# Patient Record
Sex: FEMALE | Race: WHITE | NOT HISPANIC OR LATINO | Employment: FULL TIME | URBAN - METROPOLITAN AREA
[De-identification: names, ages, dates, MRNs, and addresses within clinical notes are randomized per-mention and may not be internally consistent; named-entity substitution may affect disease eponyms.]

---

## 2017-09-18 ENCOUNTER — HOSPITAL ENCOUNTER (OUTPATIENT)
Dept: RADIOLOGY | Facility: MEDICAL CENTER | Age: 62
End: 2017-09-18
Attending: FAMILY MEDICINE
Payer: COMMERCIAL

## 2017-09-18 DIAGNOSIS — Z12.31 VISIT FOR SCREENING MAMMOGRAM: ICD-10-CM

## 2017-09-18 PROCEDURE — G0202 SCR MAMMO BI INCL CAD: HCPCS

## 2018-07-16 ENCOUNTER — OFFICE VISIT (OUTPATIENT)
Dept: MEDICAL GROUP | Facility: PHYSICIAN GROUP | Age: 63
End: 2018-07-16
Payer: COMMERCIAL

## 2018-07-16 VITALS
HEART RATE: 72 BPM | OXYGEN SATURATION: 95 % | RESPIRATION RATE: 14 BRPM | DIASTOLIC BLOOD PRESSURE: 86 MMHG | SYSTOLIC BLOOD PRESSURE: 126 MMHG | HEIGHT: 64 IN | BODY MASS INDEX: 22.02 KG/M2 | WEIGHT: 129 LBS | TEMPERATURE: 98.6 F

## 2018-07-16 DIAGNOSIS — Z79.899 CHRONIC USE OF BENZODIAZEPINE FOR THERAPEUTIC PURPOSE: ICD-10-CM

## 2018-07-16 DIAGNOSIS — F33.1 MODERATE EPISODE OF RECURRENT MAJOR DEPRESSIVE DISORDER (HCC): ICD-10-CM

## 2018-07-16 DIAGNOSIS — Z12.39 SCREENING FOR BREAST CANCER: ICD-10-CM

## 2018-07-16 DIAGNOSIS — F41.9 ANXIETY: ICD-10-CM

## 2018-07-16 PROCEDURE — 99204 OFFICE O/P NEW MOD 45 MIN: CPT | Performed by: NURSE PRACTITIONER

## 2018-07-16 RX ORDER — ESTRADIOL 0.1 MG/G
1 CREAM VAGINAL
Qty: 1 TUBE | Refills: 6 | Status: SHIPPED | OUTPATIENT
Start: 2018-07-16 | End: 2018-07-20

## 2018-07-16 RX ORDER — BUPROPION HYDROCHLORIDE 150 MG/1
150 TABLET ORAL EVERY MORNING
Qty: 30 TAB | Refills: 1 | Status: SHIPPED | OUTPATIENT
Start: 2018-07-16 | End: 2018-08-20 | Stop reason: SDUPTHER

## 2018-07-16 RX ORDER — ALPRAZOLAM 2 MG/1
2 TABLET ORAL NIGHTLY PRN
COMMUNITY
End: 2018-07-26 | Stop reason: SDUPTHER

## 2018-07-16 RX ORDER — TEMAZEPAM 30 MG/1
30 CAPSULE ORAL NIGHTLY PRN
COMMUNITY
End: 2018-07-26 | Stop reason: SDUPTHER

## 2018-07-16 ASSESSMENT — PATIENT HEALTH QUESTIONNAIRE - PHQ9
5. POOR APPETITE OR OVEREATING: 1 - SEVERAL DAYS
SUM OF ALL RESPONSES TO PHQ QUESTIONS 1-9: 14
CLINICAL INTERPRETATION OF PHQ2 SCORE: 4

## 2018-07-17 NOTE — ASSESSMENT & PLAN NOTE
Currently taking sertraline 50 mg.  Has not really helped with this.  Feeling withdrawn, not feeling interested in normal activities.  Feels that it is stress related.  Feeling low libido.  Would like to try something else.

## 2018-07-17 NOTE — PROGRESS NOTES
Lupe Thompson is a 62 y.o. female here today to establish care and for evaluation and management of:    HPI:    Moderate episode of recurrent major depressive disorder (HCC)  Currently taking sertraline 50 mg.  Has not really helped with this.  Feeling withdrawn, not feeling interested in normal activities.  Feels that it is stress related.  Feeling low libido.  Would like to try something else.          Anxiety  Taking two benzodiazepams at night.  Will work on decreasing xanax.       Current medicines (including changes today)  Current Outpatient Prescriptions   Medication Sig Dispense Refill   • sertraline (ZOLOFT) 50 MG Tab Take 50 mg by mouth every day.     • temazepam (RESTORIL) 30 MG capsule Take 30 mg by mouth at bedtime as needed for Sleep.     • alprazolam (XANAX) 2 MG tablet Take 2 mg by mouth at bedtime as needed for Sleep.     • estradiol (ESTRACE) 0.1 MG/GM vaginal cream Insert 1 g in vagina every 3 days. 1 Tube 6   • buPROPion (WELLBUTRIN XL) 150 MG XL tablet Take 1 Tab by mouth every morning. 30 Tab 1   • Ferrous Sulfate (IRON CR PO) Take 1 Tab by mouth every day.     • Multiple Vitamin (MULTIVITAMIN PO) Take 1 Tab by mouth every day.     • CALCIUM PO Take 1 Tab by mouth 3 times a day.     • VITAMIN D PO Take 2 Tabs by mouth 2 Times a Day.       No current facility-administered medications for this visit.        She  has a past medical history of Anxiety; Arthritis; Breast cancer ([facility]); Cancer ([facility]) (1993); Dental disorder; Depression; and Other specified symptom associated with female genital organs. She also has no past medical history of Hypertension.    She  has a past surgical history that includes hysterectomy laparoscopy (07/1980); other; gastric banding laparoscopic (8/25/2010); pr radiation therapy plan simple; abdominal exploration; and lumpectomy.    Social History   Substance Use Topics   • Smoking status: Never Smoker   • Smokeless tobacco: Never Used   • Alcohol use Yes       "Comment: once a month       Social History     Social History Narrative   • No narrative on file       Family History   Problem Relation Age of Onset   • Cancer Maternal Aunt    • Hypertension Mother    • Cancer Father      lung       Family Status   Relation Status   • Maternal Aunt Alive   • Mother Alive   • Father          ROS  As stated in hpi  All other systems reviewed and are negative     Objective:     Blood pressure 126/86, pulse 72, temperature 37 °C (98.6 °F), resp. rate 14, height 1.619 m (5' 3.75\"), weight 58.5 kg (129 lb), SpO2 95 %. Body mass index is 22.32 kg/m².  Physical Exam:    Constitutional: Alert, no distress.  Skin: Warm, dry, good turgor, no rashes in visible areas.  Eye: Equal, round and reactive, conjunctiva clear, lids normal.  ENMT: Lips without lesions, good dentition, oropharynx clear.  Neck: Trachea midline, no masses, no thyromegaly. No cervical or supraclavicular lymphadenopathy.  Respiratory: Unlabored respiratory effort, lungs clear to auscultation, no wheezes, no ronchi.  Cardiovascular: Normal S1, S2, no murmur, no edema.  Abdomen: Soft, non-tender, no masses, no hepatosplenomegaly.  Psych: Alert and oriented x3, normal affect and mood.        Assessment and Plan:   The following treatment plan was discussed    1. Moderate episode of recurrent major depressive disorder (HCC)  This is a new problem to me.  Chronic, ongoing issue, unstable.  Will wean off sertraline and try wellbutrin.  Return in 4 weeks.  Patient does not want to seek counseling at this time.  Monitor and follow.                                                                 2. Chronic use of benzodiazepine for therapeutic purpose  This is a new problem to me.  Chronic, ongoing, unstable.  Patient is on two benzodiazepams.  Discussed withdrawal symptoms.  She wants to come off the Xanax and keep the Temazepam at night for sleep.  UDS/Agreement ordered.  Monitor and follow.  Return in 4 weeks.  Plan " discussed to wean down on the Xanax.    - [facility] PAIN MANAGEMENT SCREEN; Future  - Controlled Substance Treatment Agreement    3. Anxiety  See #2    4. Screening for breast cancer  Due for mammogram.  History of left lumpectomy in the past, benign.  Order provided.  Monitor and follow.   - MA-SCREEN MAMMO W/CAD-BILAT; Future      Records requested.  Followup: No Follow-up on file.

## 2018-07-18 ENCOUNTER — TELEPHONE (OUTPATIENT)
Dept: MEDICAL GROUP | Facility: PHYSICIAN GROUP | Age: 63
End: 2018-07-18

## 2018-07-18 DIAGNOSIS — N95.1 MENOPAUSAL SYNDROME ON HORMONE REPLACEMENT THERAPY: ICD-10-CM

## 2018-07-18 DIAGNOSIS — Z79.890 MENOPAUSAL SYNDROME ON HORMONE REPLACEMENT THERAPY: ICD-10-CM

## 2018-07-18 NOTE — TELEPHONE ENCOUNTER
VOICEMAIL  1. Caller Name: Lupe                      Call Back Number: [phone] (home) [phone] (work)      2. Message: Patient states the estradiol cream is almost $280 and she cannot afford it, she would like to go back on what she was on previously. Please advise     3. Patient approves office to leave a detailed voicemail/MyChart message: N\A

## 2018-07-19 NOTE — TELEPHONE ENCOUNTER
Can we call patient and find out what medication she was on previously.  I do not see a vaginal estrogen or other medication in her history  GIFTY Chou.

## 2018-07-20 RX ORDER — ESTERIFIED ESTROGEN AND METHYLTESTOSTERONE .625; 1.25 MG/1; MG/1
1 TABLET ORAL DAILY
Qty: 90 TAB | Refills: 0 | Status: SHIPPED | OUTPATIENT
Start: 2018-07-20 | End: 2018-10-23 | Stop reason: SDUPTHER

## 2018-07-20 NOTE — TELEPHONE ENCOUNTER
Left message informing patient.    RX faxed to     [facility] - [place] - [address]  [address] [place] [address]  Phone: [phone] Fax: [phone]

## 2018-07-20 NOTE — TELEPHONE ENCOUNTER
I have reordered the Covaryx.  We will fax over to your pharmacy.  I placed a 90 day supply.  GIFTY Chou.

## 2018-07-26 DIAGNOSIS — F51.04 PSYCHOPHYSIOLOGICAL INSOMNIA: ICD-10-CM

## 2018-07-26 DIAGNOSIS — F41.9 ANXIETY: ICD-10-CM

## 2018-07-26 RX ORDER — TEMAZEPAM 30 MG/1
30 CAPSULE ORAL NIGHTLY PRN
Qty: 30 CAP | Refills: 0 | Status: SHIPPED
Start: 2018-07-26 | End: 2018-08-20 | Stop reason: SDUPTHER

## 2018-07-26 RX ORDER — ALPRAZOLAM 2 MG/1
2 TABLET ORAL NIGHTLY PRN
Qty: 30 TAB | Refills: 0 | Status: SHIPPED
Start: 2018-07-26 | End: 2018-08-20 | Stop reason: SDUPTHER

## 2018-07-26 NOTE — TELEPHONE ENCOUNTER
Requested Prescriptions     Signed Prescriptions Disp Refills   • alprazolam (XANAX) 2 MG tablet 30 Tab 0     Sig: Take 1 Tab by mouth at bedtime as needed for Sleep for up to 30 days.     Authorizing Provider: MINERVA LAUREANO   • temazepam (RESTORIL) 30 MG capsule 30 Cap 0     Sig: Take 1 Cap by mouth at bedtime as needed for Sleep for up to 30 days.     Authorizing Provider: MINERVA LAUREANO A.P.R.N.

## 2018-07-26 NOTE — TELEPHONE ENCOUNTER
RXS FAXED TO     [facility] - [place] - [address]  [address] [address]  Phone: [phone] Fax: [phone]

## 2018-07-26 NOTE — TELEPHONE ENCOUNTER
Was the patient seen in the last year in this department? Yes    Does patient have an active prescription for medications requested? No     Received Request Via: Patient

## 2018-08-20 ENCOUNTER — OFFICE VISIT (OUTPATIENT)
Dept: MEDICAL GROUP | Facility: PHYSICIAN GROUP | Age: 63
End: 2018-08-20
Payer: COMMERCIAL

## 2018-08-20 VITALS
HEIGHT: 64 IN | RESPIRATION RATE: 14 BRPM | TEMPERATURE: 99.7 F | WEIGHT: 131 LBS | HEART RATE: 79 BPM | OXYGEN SATURATION: 96 % | BODY MASS INDEX: 22.36 KG/M2 | DIASTOLIC BLOOD PRESSURE: 80 MMHG | SYSTOLIC BLOOD PRESSURE: 130 MMHG

## 2018-08-20 DIAGNOSIS — F51.04 PSYCHOPHYSIOLOGICAL INSOMNIA: ICD-10-CM

## 2018-08-20 DIAGNOSIS — F41.9 ANXIETY: ICD-10-CM

## 2018-08-20 DIAGNOSIS — F33.1 MODERATE EPISODE OF RECURRENT MAJOR DEPRESSIVE DISORDER (HCC): ICD-10-CM

## 2018-08-20 DIAGNOSIS — Z12.11 ENCOUNTER FOR SCREENING FECAL OCCULT BLOOD TESTING: ICD-10-CM

## 2018-08-20 PROCEDURE — 99214 OFFICE O/P EST MOD 30 MIN: CPT | Performed by: NURSE PRACTITIONER

## 2018-08-20 RX ORDER — TEMAZEPAM 30 MG/1
30 CAPSULE ORAL NIGHTLY PRN
Qty: 30 CAP | Refills: 3 | Status: SHIPPED
Start: 2018-08-20 | End: 2018-12-19 | Stop reason: SDUPTHER

## 2018-08-20 RX ORDER — ALPRAZOLAM 2 MG/1
2 TABLET ORAL
Qty: 30 TAB | Refills: 3 | Status: SHIPPED
Start: 2018-08-25 | End: 2018-12-19 | Stop reason: SDUPTHER

## 2018-08-20 RX ORDER — BUPROPION HYDROCHLORIDE 150 MG/1
150 TABLET ORAL EVERY MORNING
Qty: 90 TAB | Refills: 3 | Status: SHIPPED | OUTPATIENT
Start: 2018-08-20 | End: 2019-08-19

## 2018-08-21 ENCOUNTER — HOSPITAL ENCOUNTER (OUTPATIENT)
Facility: MEDICAL CENTER | Age: 63
End: 2018-08-21
Attending: NURSE PRACTITIONER
Payer: COMMERCIAL

## 2018-08-21 PROCEDURE — 82274 ASSAY TEST FOR BLOOD FECAL: CPT

## 2018-08-21 NOTE — PROGRESS NOTES
Chief Complaint   Patient presents with   • Follow-Up   • Anxiety   • Depression       Subjective:   Lupe Thompson is a 62 y.o. female here today for evaluation and management of:    Moderate episode of recurrent major depressive disorder (HCC)  States that the wellbutrin is helping her feel better.  States he anxiety and depression are much improved.  Denies suicidal thoughts or plan.  No side effects reported.     Anxiety  Using temazepam at night for sleep with good results.  Using Xanax 1/2-1 daily.  Last dose was 2 days ago.  Refills requested, they will be due for fill in 5 days.  UDS in place. No discrepancy on          Current medicines (including changes today)  Current Outpatient Prescriptions   Medication Sig Dispense Refill   • [START ON 8/25/2018] alprazolam (XANAX) 2 MG tablet Take 1 Tab by mouth 1 time daily as needed for Anxiety for up to 30 days. 30 Tab 3   • temazepam (RESTORIL) 30 MG capsule Take 1 Cap by mouth at bedtime as needed for Sleep for up to 30 days. 30 Cap 3   • buPROPion (WELLBUTRIN XL) 150 MG XL tablet Take 1 Tab by mouth every morning. 90 Tab 3   • esterified estrogens-methyltest (ESTRATEST HS) 0.625-1.25 MG Tab Take 1 Tab by mouth every day for 90 days. 90 Tab 0   • Ferrous Sulfate (IRON CR PO) Take 1 Tab by mouth every day.     • Multiple Vitamin (MULTIVITAMIN PO) Take 1 Tab by mouth every day.     • CALCIUM PO Take 1 Tab by mouth 3 times a day.     • VITAMIN D PO Take 2 Tabs by mouth 2 Times a Day.       No current facility-administered medications for this visit.      She  has a past medical history of Anxiety; Arthritis; Breast cancer (HCC); Cancer ([facility]) (1993); Dental disorder; Depression; and Other specified symptom associated with female genital organs. She also has no past medical history of Hypertension.    ROS as stated in hpi  No chest pain, no shortness of breath, no abdominal pain       Objective:     Blood pressure 130/80, pulse 79, temperature 37.6 °C (99.7  "°F), resp. rate 14, height 1.619 m (5' 3.75\"), weight 59.4 kg (131 lb), SpO2 96 %. Body mass index is 22.66 kg/m². stable.   Physical Exam:  Constitutional: Alert, no distress.  Skin: Warm, dry, good turgor,no cyanosis, no rashes in visible areas.  Eye: Equal, round and reactive, conjunctiva clear, lids normal.  Ears: No tenderness, no discharge.  External canals are without any significant edema or erythema.  .  Gross auditory acuity is intact.  Nose: symmetrical without tenderness, no discharge.  Mouth/Throat: lips without lesion.  Oropharynx clear.   Neck: Trachea midline, no masses, no obvious thyroid enlargement.. . Range of motion within normal limits.  Neuro: Cranial nerves 2-12 grossly intact.  No sensory deficit.  Respiratory: Unlabored respiratory effort, lungs clear to auscultation, no wheezes, no ronchi.  Cardiovascular: Normal S1, S2, no murmur, no edema.  Abdomen: Soft, non-tender, no masses, no guarding,  no hepatosplenomegaly.  Psych: Alert and oriented x3, normal affect and mood and judgement.        Assessment and Plan:   The following treatment plan was discussed    1. Moderate episode of recurrent major depressive disorder (HCC)  Chronic, ongoing. Improved on Wellbutrin.  Refill provided.  Monitor and follow    2. Anxiety  Chronic, ongoing, stable.  Refill provided.  Monitor and follow.   - alprazolam (XANAX) 2 MG tablet; Take 1 Tab by mouth 1 time daily as needed for Anxiety for up to 30 days.  Dispense: 30 Tab; Refill: 3    3. Encounter for screening fecal occult blood testing  Due for colon cancer screening.  Will do FIT test.  Order given.   Monitor and follow results.   - OCCULT BLOOD,FECAL,IMMUNOASSAY    4. Psychophysiological insomnia  Chronic, ongoing. Stable on temazepam.  Refill provided.  Monitor and follow.  No discrepancies noted.   - temazepam (RESTORIL) 30 MG capsule; Take 1 Cap by mouth at bedtime as needed for Sleep for up to 30 days.  Dispense: 30 Cap; Refill: 3      Followup: " Return in about 1 year (around 8/20/2019).         Educated in proper administration of medication(s) ordered today including safety, possible SE, risks, benefits, rationale and alternatives to therapy.     Please note that this dictation was created using voice recognition software. I have made every reasonable attempt to correct obvious errors, but I expect that there are errors of grammar and possibly content that I did not discover before finalizing the note.

## 2018-08-21 NOTE — ASSESSMENT & PLAN NOTE
Using temazepam at night for sleep with good results.  Using Xanax 1/2-1 daily.  Last dose was 2 days ago.  Refills requested, they will be due for fill in 5 days.  UDS in place. No discrepancy on

## 2018-08-21 NOTE — ASSESSMENT & PLAN NOTE
States that the wellbutrin is helping her feel better.  States he anxiety and depression are much improved.  Denies suicidal thoughts or plan.  No side effects reported.

## 2018-08-29 LAB — HEMOCCULT STL QL IA: NEGATIVE

## 2018-09-19 ENCOUNTER — HOSPITAL ENCOUNTER (OUTPATIENT)
Dept: RADIOLOGY | Facility: MEDICAL CENTER | Age: 63
End: 2018-09-19
Attending: NURSE PRACTITIONER
Payer: COMMERCIAL

## 2018-09-19 DIAGNOSIS — Z12.39 SCREENING FOR BREAST CANCER: ICD-10-CM

## 2018-09-19 PROCEDURE — 77067 SCR MAMMO BI INCL CAD: CPT

## 2018-10-01 ENCOUNTER — PATIENT MESSAGE (OUTPATIENT)
Dept: MEDICAL GROUP | Facility: PHYSICIAN GROUP | Age: 63
End: 2018-10-01

## 2018-10-19 ENCOUNTER — PATIENT MESSAGE (OUTPATIENT)
Dept: MEDICAL GROUP | Facility: PHYSICIAN GROUP | Age: 63
End: 2018-10-19

## 2018-10-19 DIAGNOSIS — Z79.890 MENOPAUSAL SYNDROME ON HORMONE REPLACEMENT THERAPY: ICD-10-CM

## 2018-10-19 DIAGNOSIS — N95.1 MENOPAUSAL SYNDROME ON HORMONE REPLACEMENT THERAPY: ICD-10-CM

## 2018-10-19 RX ORDER — ESTERIFIED ESTROGEN AND METHYLTESTOSTERONE .625; 1.25 MG/1; MG/1
1 TABLET ORAL DAILY
Qty: 90 TAB | Refills: 0
Start: 2018-10-19 | End: 2019-01-17

## 2018-10-19 NOTE — TELEPHONE ENCOUNTER
Please have patient come to see me.  Patient has a history of breast cancer and I am not comfortable refilling this medication.  I do not know who originally put her on this replacement.  Thanks  JUNAID Chou

## 2018-10-19 NOTE — TELEPHONE ENCOUNTER
Requested Prescriptions     Pending Prescriptions Disp Refills   • esterified estrogens-methyltest (ESTRATEST HS) 0.625-1.25 MG Tab 90 Tab 0     Sig: Take 1 Tab by mouth every day for 90 days.     GIFTY Chou.

## 2018-10-23 RX ORDER — ESTERIFIED ESTROGEN AND METHYLTESTOSTERONE .625; 1.25 MG/1; MG/1
1 TABLET ORAL DAILY
Qty: 90 TAB | Refills: 0 | Status: SHIPPED
Start: 2018-10-23 | End: 2019-01-21

## 2018-10-23 NOTE — PATIENT COMMUNICATION
RX FAXED TO     [facility] - [place] - [address]  [address] [address]  Phone: [phone] Fax: [phone]

## 2018-12-19 DIAGNOSIS — F51.04 PSYCHOPHYSIOLOGICAL INSOMNIA: ICD-10-CM

## 2018-12-19 DIAGNOSIS — F41.9 ANXIETY: ICD-10-CM

## 2018-12-19 RX ORDER — ALPRAZOLAM 2 MG/1
2 TABLET ORAL
Qty: 30 TAB | Refills: 0 | Status: SHIPPED
Start: 2018-12-19 | End: 2019-01-24 | Stop reason: SDUPTHER

## 2018-12-19 RX ORDER — TEMAZEPAM 30 MG/1
30 CAPSULE ORAL NIGHTLY PRN
Qty: 30 CAP | Refills: 0 | Status: SHIPPED
Start: 2018-12-19 | End: 2019-01-24 | Stop reason: SDUPTHER

## 2018-12-19 NOTE — TELEPHONE ENCOUNTER
Prescription faxed to:    [facility]  [place] - [address]  [address] [address]  Phone: [phone] Fax: [phone]  .

## 2018-12-19 NOTE — TELEPHONE ENCOUNTER
Was the patient seen in the last year in this department? Yes    Does patient have an active prescription for medications requested? No     Received Request Via: Pharmacy       AUBRIE CLARK     Age: 63  demographics  Data as of: 12/19/2018              NARCOTIC 170        SEDATIVE 412       STIMULANT 000       NARxSCORES can range from 000 to 999. The first two digits represent the composite percentile risk based on an overall analysis of prescription drug use. The third digit represents the number of active prescriptions. The distribution of scores in the population is such that approximately 75% fall below 200, 95% fall below 500 and 99% fall below 650. The information on this report is not warranted as accurate or complete. This report is based on the search criteria supplied and the data entered by the dispensing pharmacy. For more information about any prescription, please contact the dispensing pharmacy or the prescriber. NARxSCORES and Reports are intended to aid, not replace medical decision making. None of the information presented should be used as sole justification for providing or refusing to provide medications.       Rx Graph Grayed out drugs could not be included in score calculations.   [x] Narcotic [x] Sedative [x] Stimulant [x] Buprenorphine [x] Other    Created with Raphaël 2.2.0All Prescribers  Created with Raphaël [id number] - Molly ANDI Sánchez2 - Ryne Harta3 - Jono Augustin  Lorazepam MgEq (LME)  Created with Raphaël 2.2.3178782  Created with Raphaël 2.2.8Zynbraeh41/[id number]  *Per CDC guidance, the MME conversion factors prescribed or provided as part of the medication-assisted treatment for opioid use disorder should not be used to benchmark against dosage thresholds meant for opioids prescribed for pain. Buprenorphine products have no agreed upon morphine equivalency, and as partial opioid agonists, are not expected to be associated with overdose risk  in the same dose-dependent manner as doses for full agonist opioids. MME = morphine milligram equivalents. LME = Lorazepam milligram equivalents. mg = dose in milligrams.     Data Analysis   Narcotics (170) 2 months 6 months 1 year 2 years   Prescribers (narcotic, sedative) 1  19 2  22 3  23 3  16   Pharmacies (narcotic, sedative) 1  25 1  16 1  13 2  19   Morphine mg 0  0 0  0 0  0 0  0   Morphine overlap (1) 0  0 0  0 0  0 0  0           Sedatives (412) 2 months 6 months 1 year 2 years   Prescribers (narcotic, sedative) 1  19 2  22 3  23 3  16   Pharmacies (narcotic, sedative) 1  25 1  16 1  13 2  19   Sedative mg 330  91 1110  97 2620  98 6325  99   Sedative overlap (1) 0  0 1  1 1  1 1  1           Stimulants (000)  2 months 6 months 1 year 2 years   Prescribers (stimulant) 0  0 0  0 0  0 0  0   Pharmacies (stimulant) 0  0 0  0 0  0 0  0   Stimulant days 0  0 0  0 0  0 0  0   Stimulant overlap (1) 0  0 0  0 0  0 0  0      (1) Number of days for which a simliar type of medication was prescribed from different prescribers for use on the same day.         Summary   Total Prescriptions: 54   Total Prescribers: 3   Total Pharmacies: 2   Narcotics*    (excluding buprenorphine)  Current Qty: 0   Current MME/day:    30 Day Avg MME/day:    Buprenorphine*   Current Qty: 0   Current mg/day:    30 Day Avg mg/day:    Sedatives*   Current Qty: 0   Current LME/day:    30 Day Avg LME/day:    *Highlighted drugs could not be included in score calculations   PRESCRIPTIONS  Total Prescriptions: 54   Total Private Pay: 2   Fill Date ID Written Drug Qty Days Prescriber Rx # Pharmacy Refill Daily Dose * Pymt Type    11/26/2018  2   08/20/2018  Temazepam 30 MG Capsule  30 30 Lo Niranjan  [id number] [facility]  3 1.50 LME  [place]   11/26/2018  2   08/20/2018  Alprazolam 2 MG Tablet  30 30 Lo Niranjan  [id number] [facility]  3 4.00 L[place]   10/25/2018  2   08/20/2018  Alprazolam 2 MG  Tablet  30 30 Lo Niranjan  [id number] [facility] ([facility])  2 4.00 L[place]  [place]   10/25/2018  2   08/20/2018  Temazepam 30 MG Capsule  30 30 Lo Niranjan  [id number] [facility] ([facility])  2 1.50 L[place]  [place]   10/23/2018  2   10/23/2018  Estrogen-Methyltestos H.S. Tab  90 90 Lo Niranjan  [id number] [facility] ([facility]  0  [place]   09/24/2018  2   08/20/2018  Alprazolam 2 MG Tablet  30 30 Lo Niranjan  [id number] [facility] ([facility])  1 4.00 L[place]  [place]   09/24/2018  2   08/20/2018  Temazepam 30 MG Capsule  30 30 Lo Niranjan  [id number] [facility] ([facility])  1 1.50 L[place]  [place]   08/24/2018  2   08/20/2018  Alprazolam 2 MG Tablet  30 30 Lo Niranjan  [id number] [facility] ([facility])  0 4.00 LME  [place]   08/24/2018  2   08/20/2018  Temazepam 30 MG Capsule  30 30 Lo Niranjan  [id number] [facility] ([facility])  0 1.50 L[place]  [place]   07/26/2018  2   07/26/2018  Alprazolam 2 MG Tablet  30 30 Lo Niranjan  [id number] [facility] ([facility])  0 4.00 LME  [place]   07/26/2018  2   07/26/2018  Temazepam 30 MG Capsule  30 30 Lo Niranjan  [id number] [facility] ([facility])  0 1.50 LME  [place]   07/20/2018  2   07/20/2018  Estrogen-Methyltestos H.S. Tab  90 90 Lo Niranjan  [id number] [facility] ([facility])  0  [place]   06/27/2018  2   04/30/2018  Alprazolam 2 MG Tablet  60 30 Lugo Michael  [id number] [facility] ([facility])  2 8.00 LME  [place]   06/26/2018  2   04/30/2018  Temazepam 30 MG Capsule  30 30 Lugo Michael  [id number] [facility] ([facility])  2 1.50 LME  [place]   05/29/2018  2   04/30/2018  Alprazolam 2 MG Tablet  60 30 Lugo Michael  [id number] [facility] ([facility])  1 8.00 LME  [place]   05/29/2018  2   04/30/2018  Temazepam 30 MG Capsule  30 30 Lugo Michael  [id number] [facility] ([facility])  1 1.50 LME  [place]   04/30/2018  2   04/30/2018  Temazepam 30 MG Capsule  30 30 Lugo Micheal  [id number] [facility] ([facility])  0 1.50 LME  [place]   04/30/2018  2   04/30/2018  Alprazolam 2 MG Tablet  60 30 Lugo Michael  [id number] [facility] ([facility])  0 8.00 LME  [place]   04/27/2018  2   04/27/2018  Alprazolam 2 MG Tablet  10 10 Lugo Michael  [id number] [facility] ([facility])  0 4.00 LME  [place]   04/05/2018  2   01/29/2018  Temazepam 30 MG Capsule  30 30 Lugo Michael   [id number] [facility] ([facility])  2 1.50 LME  [place]   03/19/2018  2   01/29/2018  Alprazolam 2 MG Tablet  60 30 Lugo Michael  [id number] [facility] ([facility])  1 8.00 LME  [place]   03/07/2018  2   01/29/2018  Temazepam 30 MG Capsule  30 30 Lugo Michael  [id number] [facility] ([facility])  1 1.50 LME  [place]   02/07/2018  2   01/29/2018  Temazepam 30 MG Capsule  30 30 Lugo Michael  [id number] [facility] ([facility])  0 1.50 LME  [place]   02/07/2018  2   01/29/2018  Alprazolam 2 MG Tablet  60 30 Lugo Michael  [id number] [facility] ([facility])  0 8.00 LME  [place]

## 2019-01-24 DIAGNOSIS — F51.04 PSYCHOPHYSIOLOGICAL INSOMNIA: ICD-10-CM

## 2019-01-24 DIAGNOSIS — F41.9 ANXIETY: ICD-10-CM

## 2019-01-24 RX ORDER — TEMAZEPAM 30 MG/1
30 CAPSULE ORAL NIGHTLY PRN
Qty: 30 CAP | Refills: 0 | Status: SHIPPED
Start: 2019-01-24 | End: 2019-02-23

## 2019-01-24 RX ORDER — ALPRAZOLAM 2 MG/1
2 TABLET ORAL
Qty: 30 TAB | Refills: 0 | Status: SHIPPED
Start: 2019-01-24 | End: 2019-02-26 | Stop reason: SDUPTHER

## 2019-01-24 NOTE — TELEPHONE ENCOUNTER
Prescription faxed to:    [facility]  [place] - [address]  [address] [address]  Phone: [phone] Fax: [phone]  .

## 2019-01-24 NOTE — TELEPHONE ENCOUNTER
Requested Prescriptions     Signed Prescriptions Disp Refills   • temazepam (RESTORIL) 30 MG capsule 30 Cap 0     Sig: Take 1 Cap by mouth at bedtime as needed for Sleep for up to 30 days.     Authorizing Provider: MINERVA LAUREANO   • alprazolam (XANAX) 2 MG tablet 30 Tab 0     Sig: Take 1 Tab by mouth 1 time daily as needed for Anxiety for up to 30 days.     Authorizing Provider: MINERVA LAUREANO A.P.R.N.

## 2019-01-31 ENCOUNTER — PATIENT MESSAGE (OUTPATIENT)
Dept: MEDICAL GROUP | Facility: PHYSICIAN GROUP | Age: 64
End: 2019-01-31

## 2019-01-31 DIAGNOSIS — F51.04 PSYCHOPHYSIOLOGICAL INSOMNIA: ICD-10-CM

## 2019-02-01 NOTE — TELEPHONE ENCOUNTER
From: Lupe Thompson  To: JUNAID hCou  Sent: 1/31/2019 5:28 PM PST  Subject: Prescription Question    [facility] in [place] will not have Temazepam until March. Will you please call in a prescription to [facility] in [place], [address] [place] [address] [address], [place] [address], telephone [phone]. On Saturday, I was told it would be 1-2 days. I have been out since Saturday and I am not sleeping. You can call me at work tomorrow at [phone] or on my cell [phone]. Thank you.

## 2019-02-01 NOTE — PATIENT COMMUNICATION
[facility].  Call [employer] [place] [phone] spoke with Prior Auth Department  And they are aware of the Manufacture Back order and has done a 3 month over ride for this new Prescription temazepam 15 mg  2 caps by PO at bed time.   [facility] has been advised. While I was on the phone Savannah ran it and went thru. Pt will beable to  Today.

## 2019-02-01 NOTE — PATIENT COMMUNICATION
Called [facility] in [place] they are currently out of 30 mg of Temazepam and do not know when they will get any in stock. Pharmacist asked if it was ok to change to 15 mg and patient take 2 tabs to equal 30 mg. Ok to change to 15 mg for this fill only. Pt has been informed of change and Rx is still at [facility] pharmacy   Last refill on Armin was 12/19/18.

## 2019-02-26 DIAGNOSIS — F41.9 ANXIETY: ICD-10-CM

## 2019-02-26 RX ORDER — ALPRAZOLAM 2 MG/1
2 TABLET ORAL
Qty: 30 TAB | Refills: 0 | Status: SHIPPED
Start: 2019-02-26 | End: 2019-03-27 | Stop reason: SDUPTHER

## 2019-02-26 NOTE — TELEPHONE ENCOUNTER
Requested Prescriptions     Signed Prescriptions Disp Refills   • alprazolam (XANAX) 2 MG tablet 30 Tab 0     Sig: Take 1 Tab by mouth 1 time daily as needed for Anxiety for up to 30 days.     Authorizing Provider: MINERVA LAUREANO A.P.R.N.

## 2019-02-26 NOTE — TELEPHONE ENCOUNTER
[facility] faxed to:    [facility]  [place] - [address]  [address] [address]  Phone: [phone] Fax: [phone]  .

## 2019-03-01 DIAGNOSIS — F51.04 PSYCHOPHYSIOLOGICAL INSOMNIA: ICD-10-CM

## 2019-03-01 RX ORDER — TEMAZEPAM 30 MG/1
30 CAPSULE ORAL NIGHTLY PRN
Qty: 30 CAP | Refills: 0 | Status: CANCELLED
Start: 2019-03-01 | End: 2019-03-31

## 2019-03-01 NOTE — TELEPHONE ENCOUNTER
From: Lupe Thompson  Sent: 3/1/2019 9:31 AM PST  Subject: Medication Renewal Request    Lupe JOSE Donna would like a refill of the following medications:     temazepam (RESTORIL) 30 MG capsule [Molly Sánchez A.P.R.N.]   Patient Comment: The [facility] in [place] did not have the 30 MG capsules last time. Instead I was given 60 15 MG capsules. Either prescription works for me. I woud appreciate it if this could be indicated in the prescription so that I do not run out as I did the last time. Thank you.    Preferred pharmacy: [facility] [address] [place] [address], [place] - [address]

## 2019-03-01 NOTE — TELEPHONE ENCOUNTER
Was the patient seen in the last year in this department? Yes LOV 08/20/18 NO LABS ORDERED next makayla. 08/19/19     Does patient have an active prescription for medications requested? No     Received Request Via: Pharmacy       AUBRIE CLARK     Age: 63  demographics  Data as of: 03/01/2019              NARCOTIC 160        SEDATIVE 402       STIMULANT 000       NARxSCORES can range from 000 to 999. The first two digits represent the composite percentile risk based on an overall analysis of prescription drug use. The third digit represents the number of active prescriptions. The distribution of scores in the population is such that approximately 75% fall below 200, 95% fall below 500 and 99% fall below 650. The information on this report is not warranted as accurate or complete. This report is based on the search criteria supplied and the data entered by the dispensing pharmacy. For more information about any prescription, please contact the dispensing pharmacy or the prescriber. NARxSCORES and Reports are intended to aid, not replace medical decision making. None of the information presented should be used as sole justification for providing or refusing to provide medications.       Rx Graph Grayed out drugs could not be included in score calculations.   [x] Narcotic [x] Sedative [x] Stimulant [x] Buprenorphine [x] Other    Created with Raphaël 2.2.0All Prescribers  Created with Raphaël 2.2.4Jbcvuwdl13/[id number] - Molly Sánchez2 - Ryne Harta3 - Jono Augustin  Lorazepam MgEq (LME)  Created with Raphaël 2.2.3300851  Created with Raphaël 2.2.1Nwogyrzt94/285v5r2v4j  *Per CDC guidance, the MME conversion factors prescribed or provided as part of the medication-assisted treatment for opioid use disorder should not be used to benchmark against dosage thresholds meant for opioids prescribed for pain. Buprenorphine products have no agreed upon morphine equivalency, and as partial opioid agonists, are  "not expected to be associated with overdose risk in the same dose-dependent manner as doses for full agonist opioids. MME = morphine milligram equivalents. LME = Lorazepam milligram equivalents. mg = dose in milligrams.     Data Analysis   Narcotics (160) 2 months 6 months 1 year 2 years   Prescribers (narcotic, sedative) 1  19 1  12 2  16 3  16   Pharmacies (narcotic, sedative) 1  25 1  16 1  13 2  19   Morphine mg 0  0 0  0 0  0 0  0   Morphine overlap (1) 0  0 0  0 0  0 0  0           Sedatives (402) 2 months 6 months 1 year 2 years   Prescribers (narcotic, sedative) 1  19 1  12 2  16 3  16   Pharmacies (narcotic, sedative) 1  25 1  16 1  13 2  19   Sedative mg 285  90 945  96 2500  98 6205  99   Sedative overlap (1) 0  0 0  0 1  1 1  1           Stimulants (000)  2 months 6 months 1 year 2 years   Prescribers (stimulant) 0  0 0  0 0  0 0  0   Pharmacies (stimulant) 0  0 0  0 0  0 0  0   Stimulant days 0  0 0  0 0  0 0  0   Stimulant overlap (1) 0  0 0  0 0  0 0  0      (1) Number of days for which a simliar type of medication was prescribed from different prescribers for use on the same day.         Summary   Total Prescriptions: 55   Total Prescribers: 4   Total Pharmacies: 2   Narcotics*    (excluding buprenorphine)  Current Qty: 0   Current MME/day: 0.00   30 Day Avg MME/day: 0.00   Buprenorphine*   Current Qty: 0   Current mg/day: 0.00   30 Day Avg mg/day: 0.00   Sedatives*     Alprazolam 2 Mg Tablet : 26  Temazepam 15 Mg Capsule : 2\"> Current Qty: 28   Current LME/day: 5.50    30 Day Avg LME/day: 5.13      *Highlighted drugs could not be included in score calculations   PRESCRIPTIONS  Total Prescriptions: 55   Total Private Pay: 2   Fill Date ID Written Drug Qty Days Prescriber Rx # Pharmacy Refill Daily Dose * Pymt Type    02/26/2019  2   02/26/2019  Alprazolam 2 MG Tablet  30 30 Lo [place]iranjan  [id number] [facility] ([facility]  0 4.00 [place]   02/01/2019  2   " 01/24/2019  Temazepam 15 MG Capsule  60 30 Lo Niranjan  [id number] [facility] (2500)  0 1.50 [place]   01/25/2019  2   01/25/2019  Alprazolam 2 MG Tablet  30 30 Lo Niranjan  [id number] [facility] (2500)  0 4.00 [place]   12/26/2018  2   12/19/2018  Alprazolam 2 MG Tablet  30 30 Lo Niranjan  [id number] [facility] (2500)  0 4.00 [place]   12/26/2018  2   12/19/2018  Temazepam 30 MG Capsule  30 30 Lo Niranjan  [id number] [facility] (2500)  0 1.50 [place]

## 2019-03-04 PROBLEM — F51.04 PSYCHOPHYSIOLOGICAL INSOMNIA: Status: ACTIVE | Noted: 2019-03-04

## 2019-03-04 RX ORDER — TEMAZEPAM 15 MG/1
CAPSULE ORAL
Qty: 60 CAP | Refills: 0 | Status: SHIPPED
Start: 2019-03-04 | End: 2019-04-03 | Stop reason: SDUPTHER

## 2019-03-04 NOTE — TELEPHONE ENCOUNTER
Requested Prescriptions     Signed Prescriptions Disp Refills   • temazepam (RESTORIL) 15 MG Cap 60 Cap 0     Sig: Take 1-2 pills q hs prn for sleep     Authorizing Provider: MINERVA LAUREANO A.P.R.N.

## 2019-03-04 NOTE — TELEPHONE ENCOUNTER
RX FAXED TO PHARMACY    [id number]  MINERVA HASSAN - [address]  [address] [address]  Phone: [phone] Fax: [phone]

## 2019-03-27 DIAGNOSIS — F41.9 ANXIETY: ICD-10-CM

## 2019-03-27 RX ORDER — ALPRAZOLAM 2 MG/1
2 TABLET ORAL
Qty: 30 TAB | Refills: 0 | Status: SHIPPED
Start: 2019-03-27 | End: 2019-04-25 | Stop reason: SDUPTHER

## 2019-03-28 NOTE — TELEPHONE ENCOUNTER
Prescription faxed to:    [facility]  MINERVA HASSAN - [address]  [address] [address]  Phone: [phone] Fax: [phone]  .

## 2019-04-03 DIAGNOSIS — F51.04 PSYCHOPHYSIOLOGICAL INSOMNIA: ICD-10-CM

## 2019-04-03 RX ORDER — TEMAZEPAM 15 MG/1
CAPSULE ORAL
Qty: 60 CAP | Refills: 0 | Status: SHIPPED
Start: 2019-04-03 | End: 2019-05-14 | Stop reason: SDUPTHER

## 2019-04-03 NOTE — TELEPHONE ENCOUNTER
Prescription faxed to:    [facility]  MINERVA HASSAN - [address]  [address] [address]  Phone: [phone] Fax: [phone]  .

## 2019-04-25 DIAGNOSIS — F41.9 ANXIETY: ICD-10-CM

## 2019-04-26 RX ORDER — ALPRAZOLAM 2 MG/1
2 TABLET ORAL
Qty: 30 TAB | Refills: 0 | Status: SHIPPED
Start: 2019-04-27 | End: 2019-05-24 | Stop reason: SDUPTHER

## 2019-05-14 DIAGNOSIS — F51.04 PSYCHOPHYSIOLOGICAL INSOMNIA: ICD-10-CM

## 2019-05-14 RX ORDER — TEMAZEPAM 15 MG/1
CAPSULE ORAL
Qty: 60 CAP | Refills: 0 | Status: SHIPPED
Start: 2019-05-14 | End: 2019-05-16 | Stop reason: SDUPTHER

## 2019-05-14 NOTE — TELEPHONE ENCOUNTER
Prescription faxed to:    [facility]  MINERVA HASSAN - [address]  [address] [address]  Phone: [phone] Fax: [phone]  .

## 2019-05-15 ENCOUNTER — TELEPHONE (OUTPATIENT)
Dept: MEDICAL GROUP | Facility: PHYSICIAN GROUP | Age: 64
End: 2019-05-15

## 2019-05-15 DIAGNOSIS — F51.04 PSYCHOPHYSIOLOGICAL INSOMNIA: ICD-10-CM

## 2019-05-15 NOTE — TELEPHONE ENCOUNTER
DOCUMENTATION OF PAR STATUS:    1. Name of Medication & Dose: RESTORIL     2. Name of Prescription Coverage Company & phone #: [facility] [phone]    3. Date Prior Auth Submitted: 05/15/19    4. What information was given to obtain insurance decision? Medication is covered as a 30 per 30 days    5. Prior Auth Status? Waiting on Fax form Pending    6. Patient Notified: N\A    Per Insurance Medication is covered as 30 tabs per 30 days     Over Quantity limit at current Rx 60 per 30 days.

## 2019-05-16 DIAGNOSIS — F51.04 PSYCHOPHYSIOLOGICAL INSOMNIA: ICD-10-CM

## 2019-05-16 RX ORDER — TEMAZEPAM 15 MG/1
CAPSULE ORAL
Qty: 60 CAP | Refills: 0 | Status: SHIPPED
Start: 2019-05-16 | End: 2019-05-16

## 2019-05-16 RX ORDER — TEMAZEPAM 30 MG/1
30 CAPSULE ORAL NIGHTLY PRN
Qty: 30 CAP | Refills: 0
Start: 2019-05-16 | End: 2019-06-15

## 2019-05-16 RX ORDER — TEMAZEPAM 30 MG/1
30 CAPSULE ORAL NIGHTLY PRN
Qty: 30 CAP | Refills: 0 | Status: SHIPPED
Start: 2019-05-16 | End: 2019-06-13 | Stop reason: SDUPTHER

## 2019-05-16 NOTE — TELEPHONE ENCOUNTER
Phone Number Called: [phone] (home)       Call outcome: left message for patient to call back regarding message below    Message:

## 2019-05-16 NOTE — TELEPHONE ENCOUNTER
Was the patient seen in the last year in this department? Yes    Does patient have an active prescription for medications requested? Yes INSURANCE WILL NOT COVER THE 15 MG #60, PATIENT IS REQUESTING TO GET THE 30 MG # 30 AGAIN AS THE PHARMACY WAS OUT THE LAST TIME.     Received Request Via: Patient       AUBRIE CLARK     Age: 63  demographics  Data as of: 05/16/2019              NARCOTIC 250        SEDATIVE 481       STIMULANT 000       NARxSCORES can range from 000 to 999. The first two digits represent the composite percentile risk based on an overall analysis of prescription drug use. The third digit represents the number of active prescriptions. The distribution of scores in the population is such that approximately 75% fall below 200, 95% fall below 500 and 99% fall below 650. The information on this report is not warranted as accurate or complete. This report is based on the search criteria supplied and the data entered by the dispensing pharmacy. For more information about any prescription, please contact the dispensing pharmacy or the prescriber. NARxSCORES and Reports are intended to aid, not replace medical decision making. None of the information presented should be used as sole justification for providing or refusing to provide medications.       Rx Graph Grayed out drugs could not be included in score calculations.   [x] Narcotic [x] Sedative [x] Stimulant [x] Buprenorphine [x] Other    Created with Raphaël 2.2.0All Prescribers  Created with Raphaël 2.2.3Nvdwderm86/[id number] - Jeet Tates2 - Molly Sánchez3 - Rogerio Meade C4 - Ryne Harta5 - Jono Augustin  Morphine MgEq (MME)  Created with Raphaël 2.2.5205605632  Created with Raphaël 2.2.9Jdbxinzy53/857i9r1c8t  Lorazepam MgEq (LME)  Created with Raphaël 2.2.3399583  Created with Raphaël 2.2.8Nspknoom58/774b3v5h9c  *Per CDC guidance, the MME conversion factors prescribed or provided as part of the medication-assisted  "treatment for opioid use disorder should not be used to benchmark against dosage thresholds meant for opioids prescribed for pain. Buprenorphine products have no agreed upon morphine equivalency, and as partial opioid agonists, are not expected to be associated with overdose risk in the same dose-dependent manner as doses for full agonist opioids. MME = morphine milligram equivalents. LME = Lorazepam milligram equivalents. mg = dose in milligrams.     Data Analysis   Narcotics (250) 2 months 6 months 1 year 2 years   Prescribers (narcotic, sedative) 3  51 3  32 4  30 5  26   Pharmacies (narcotic, sedative) 2  45 2  31 2  25 3  27   Morphine mg 60  10 60  13 60  13 60  12   Morphine overlap (1) 0  0 0  0 0  0 0  0           Sedatives (481) 2 months 6 months 1 year 2 years   Prescribers (narcotic, sedative) 3  51 3  32 4  30 5  26   Pharmacies (narcotic, sedative) 2  45 2  31 2  25 3  27   Sedative mg 285  90 945  96 2175  97 5680  99   Sedative overlap (1) 8  17 8  8 9  6 9  5           Stimulants (000)  2 months 6 months 1 year 2 years   Prescribers (stimulant) 0  0 0  0 0  0 0  0   Pharmacies (stimulant) 0  0 0  0 0  0 0  0   Stimulant days 0  0 0  0 0  0 0  0   Stimulant overlap (1) 0  0 0  0 0  0 0  0      (1) Number of days for which a simliar type of medication was prescribed from different prescribers for use on the same day.         Summary   Total Prescriptions: 54   Total Prescribers: 5   Total Pharmacies: 3   Narcotics*    (excluding buprenorphine)  Current Qty: 0   Current MME/day: 0.00   30 Day Avg MME/day: 0.00   Buprenorphine*   Current Qty: 0   Current mg/day: 0.00   30 Day Avg mg/day: 0.00   Sedatives*     Alprazolam 2 Mg Tablet : 9\"> Current Qty: 9   Current LME/day: 4.00    30 Day Avg LME/day: 5.03      *Highlighted drugs could not be included in score calculations   PRESCRIPTIONS  Total Prescriptions: 54   Total Private Pay: 2   Fill Date ID Written " Drug Qty Days Prescriber Rx # Pharmacy Refill Daily Dose * Pymt Type    04/26/2019  1   04/26/2019  Alprazolam 2 MG Tablet  30 30 Mo [place]  [id number]   [facility] (2500)  0 4.00 [place]   04/04/2019  1   04/03/2019  Temazepam 15 MG Capsule  60 30 Lo Niranjan  [id number]   [facility] (2500)  0 1.50 [place]   04/01/2019  2   04/01/2019  Hydrocodone-Acetamin 5-325 MG  12 2 Ch [place]  [id number]   Anirudh (4820)  0 30.00 MME  [place]   03/28/2019  1   03/27/2019  Alprazolam 2 MG Tablet  30 30 Lo Niranjan  [id number]   [facility] (2500)  0 4.00 [place]   03/04/2019  1   03/04/2019  Temazepam 15 MG Capsule  60 30 Lo Niranjan  [id number]   [facility] (2500)  0 1.50 [place]   02/26/2019  1   02/26/2019  Alprazolam 2 MG Tablet  30 30 Lo Niranjan  [id number]   [facility] (2500)  0 4.00 [place]   02/01/2019  1   01/24/2019  Temazepam 15 MG Capsule  60 30 Lo Niranjan  [id number]   [facility] (2500)  0 1.50 [place]   01/25/2019  1   01/25/2019  Alprazolam 2 MG Tablet  30 30 Lo Niranjan  [id number]   [facility] (2500)  0 4.00 [place]   12/26/2018  1   12/19/2018  Alprazolam 2 MG Tablet  30 30 Lo Niranjan  [id number]   [facility] (2500)  0 4.00 [place]   12/26/2018  1   12/19/2018  Temazepam 30 MG Capsule  30 30 Lo Niranjan  [id number]   [facility] (2500)  0 1.50 L[place]   11/26/2018  1   08/20/2018  Alprazolam 2 MG Tablet  30 30 Lo Niranjan  [id number]   [facility] (2500)  3 4.00 L[place]   11/26/2018  1   08/20/2018  Temazepam 30 MG Capsule  30 30 Lo Niranjan  [id number]   [facility] (2500)  3 1.50 L[place]   10/25/2018  1   08/20/2018  Alprazolam 2 MG Tablet  30 30 Lo Niranjan  [id number]   [facility] (2500)  2 4.00 L[place]   10/25/2018  1   08/20/2018  Temazepam 30 MG Capsule  30 30 Lo Niranjan  [id number]   [facility] (2500)  2 1.50 L[place]   10/23/2018  1   10/23/2018  Estrogen-Methyltestos H.S. Tab  90 90 Lo Niranjan  [id number]   [facility] (2500)  0  [place]   09/24/2018  1   08/20/2018  Alprazolam 2 MG Tablet  30 30 Lo Niranjan  [id number]   [facility] (2500)  1 4.00 L[place]   09/24/2018  1    08/20/2018  Temazepam 30 MG Capsule  30 30 Lo Niranjan  [id number]   [facility] (2500)  1 1.50 L[place]   08/24/2018  1   08/20/2018  Alprazolam 2 MG Tablet  30 30 Lo Niranjan  [id number]   [facility] (2500)  0 4.00 L[place]   08/24/2018  1   08/20/2018  Temazepam 30 MG Capsule  30 30 Lo Niranjan  [id number]   [facility] (2500)  0 1.50 L[place]   07/26/2018  1   07/26/2018  Alprazolam 2 MG Tablet  30 30 Lo Niranjan  [id number]   [facility] (2500)  0 4.00 L[place]   07/26/2018  1   07/26/2018  Temazepam 30 MG Capsule  30 30 Lo Niranjan  [id number]   [facility] (2500)  0 1.50 L[place]   07/20/2018  1   07/20/2018  Estrogen-Methyltestos H.S. Tab  90 90 Lo Niranjan  [id number]   [facility] (2500)  0  [place]   06/27/2018  1   04/30/2018  Alprazolam 2 MG Tablet  60 30 [place]  [id number]   [facility] (2500)  2 8.00 LME  [place]   06/26/2018  1   04/30/2018  Temazepam 30 MG Capsule  30 30 [place]  [id number]   [facility] (2500)  2 1.50 L[place]   05/29/2018  1   04/30/2018  Alprazolam 2 MG Tablet  60 30 Lugo [place]  [id number]   [facility] (2500)  1 8.00 LME  [place]   05/29/2018  1   04/30/2018  Temazepam 30 MG Capsule  30 30 [place]  [id number]   [facility] (2500)  1 1.50 LME  [place]   04/30/2018  1   04/30/2018  Temazepam 30 MG Capsule  30 30 [place]  [id number]   [facility] (2500)  0 1.50 L[place]   04/30/2018  1   04/30/2018  Alprazolam 2 MG Tablet  60 30 Lug[place]  [id number]   [facility] (2500)  0 8.00 L[place]   04/27/2018  1   04/27/2018  Alprazolam 2 MG Tablet  10 10 Lugo [place]  [id number]   [facility] (2500)  0 4.00 L[place]   04/05/2018  1   01/29/2018  Temazepam 30 MG Capsule  30 30 Lugo [place]  [id number]   [facility] (2500)  2 1.50 L[place]   03/19/2018  1   01/29/2018  Alprazolam 2 MG Tablet  60 30 [place]  [id number]   [facility] (2500)  1 8.00 L[place]   03/07/2018  1   01/29/2018  Temazepam 30 MG Capsule  30 30 [place]  [id number]   [facility] (2500)  1 1.50 L[place]   02/07/2018  1   01/29/2018  Temazepam 30 MG Capsule  30 30 [place]  [id number]   [facility] (2500)  0 1.50 L[place]    02/07/2018  1   01/29/2018  Alprazolam 2 MG Tablet  60 30 [place]  [id number]   [facility] (2500)  0 8.00 L[place]   01/08/2018  1   12/11/2017  Temazepam 30 MG Capsule  30 30 Maria T Hor  [id number]   [facility] (2500)  1 1.50 L[place]   01/08/2018  1   12/11/2017  Alprazolam 2 MG Tablet  60 30 Maria T Hor  [id number]   [facility] (2500)  1 8.00 L[place]   12/11/2017  1   12/11/2017  Temazepam 30 MG Capsule  30 30 Maria T Hor  [id number]   [facility] (2500)  0 1.50 L[place]   12/11/2017  1   12/11/2017  Alprazolam 2 MG Tablet  60 30 Maria T Hor  [id number]   [facility] (2500)  0 8.00 L[place]   12/05/2017  2   11/03/2017  Eemt Hs 0.625-1.25 MG Tablet  30 30 Maria T Hor  [id number]   Wal (6484)  1  [place]   11/14/2017  2   11/03/2017  Alprazolam 2 MG Tablet  60 30 Maria T Hor  [id number]   Wal (6484)  0 8.00 LME  [place]   11/14/2017  2   11/03/2017  Temazepam 30 MG Capsule  30 30 Maria T Hor  [id number]   Wal (6484)  0 1.50 L[place]   11/06/2017  2   11/03/2017  Eemt Hs 0.625-1.25 MG Tablet  30 30 Maria T Hor  [id number]   Wal (6484)  0  [place]   10/17/2017  1   10/17/2017  Temazepam 30 MG Capsule  30 30 Maria T Hor  [id number]   [facility] (2500)  0 1.50 L[place]   10/17/2017  1   10/17/2017  Alprazolam 2 MG Tablet  60 30 Maria T Hor  [id number]   [facility] (2500)  0 8.00 L[place]   09/18/2017  1   07/20/2017  Temazepam 30 MG Capsule  30 30 Maria T Hor  [id number]   [facility] (2500)  2 1.50 LME  [place]   09/18/2017  1   07/20/2017  Alprazolam 2 MG Tablet  60 30 Maria T Hor  [id number]   Wal (2500)  2 8.00 LME  [place]   08/17/2017  2   07/20/2017  Temazepam 30 MG Capsule  30 30 Maria T Hor  [id number]   Wal (2500)  1 1.50 L[place]   08/17/2017  2   07/20/2017  Alprazolam 2 MG Tablet  60 30 Maria T Hor  [id number]   Wal (2500)  1 8.00 L[place]   07/21/2017  2   07/20/2017  Temazepam 30 MG Capsule  30 30 Maria T Hor  [id number]   Wal (2500)  0 1.50 L[place]   07/21/2017  2   07/20/2017  Alprazolam 2 MG Tablet  60 30 Maria T Hor  [id number]   Wal (2500)  0 8.00 LME   [place]   06/24/2017  2   06/23/2017  Alprazolam 2 MG Tablet  60 30 Maria T Hor  [id number]   Wal (2500)  0 8.00 L[place]   06/24/2017  2   06/23/2017  Temazepam 30 MG Capsule  30 30 Maria T Hor  [id number]   Wal (2500)  0 1.50 LME  [place]   05/28/2017  2   05/01/2017  Temazepam 30 MG Capsule  30 30 Maria T Hor  [id number]   Wal (2500)  1 1.50 LME  [place]   05/28/2017  2   05/01/2017  Alprazolam 2 MG Tablet  60 30 Maria T Hor  [id number]   Wal (2500)  1 8.00 LME  [place]   *Per CDC guidance, the MME conversion factors prescribed or provided as part of the medication-assisted treatment for opioid use disorder should not be used to benchmark against dosage thresholds meant for opioids prescribed for pain. Buprenorphine products have no agreed upon morphine equivalency, and as partial opioid agonists, are not expected to be associated with overdose risk in the same dose-dependent manner as doses for full agonist opioids. MME = morphine milligram equivalents. LME = Lorazepam milligram equivalents. MG = dose in milligrams.   PROVIDERS  Total Providers: 5   Name Address City State Zipcode Phone   Jono Ramirez Isadora [address] [place] [address]    Ryne RIGGS Quinteros [address] [place] [address]    Molly Sánchez [address] [facility] [place] [address]    Rogerio Pabon, Jr [address] [place] [address]    Jeet Oliveira [address] [facility] [address]    PHARMACIES  Total Pharmacies: 3   Name Address City State Zipcode Phone   [facility]  [phone] [place] [address] [phone]   [facility] [phone] : [facility] [place] [address] [phone]   [id number] [phone]) [phone] [place] [address] [phone]

## 2019-05-16 NOTE — TELEPHONE ENCOUNTER
Please call patient as I see a prescription of [place] on her pharmacy report.  I am not sure if she had a recent surgery.  Our controlled substance agreement indicates that you need to notify me if you are given other controlled substances.  Please let me know  GIFTY Chou.

## 2019-05-17 NOTE — TELEPHONE ENCOUNTER
VOICEMAIL  1. Caller Name: Lupe                      Call Back Number: [phone] (home) [phone] (work)      2. Message: Patient had some oral surgery and she did not even take the Norco, can you please refill her 30 mg restoril    3. Patient approves office to leave a detailed voicemail/MyChart message: N\A

## 2019-05-17 NOTE — TELEPHONE ENCOUNTER
Prescription faxed to:    [facility]  MINERVA HASSAN - [address]  [address] [facility] [address]  Phone: [phone] Fax: [phone]  .

## 2019-05-17 NOTE — TELEPHONE ENCOUNTER
Requested Prescriptions     Signed Prescriptions Disp Refills   • temazepam (RESTORIL) 15 MG Cap 60 Cap 0     Sig: Take 1-2 pills q hs prn for sleep     Authorizing Provider: MINERVA SÁNCHEZ     Refused Prescriptions Disp Refills   • temazepam (RESTORIL) 30 MG capsule 30 Cap 0     Sig: Take 1 Cap by mouth at bedtime as needed for Sleep for up to 30 days.     Refused By: MINERVA SÁNCHEZ     Reason for Refusal: Refill not appropriate.     Minerva Sánchez A.P.R.N.

## 2019-05-17 NOTE — PROGRESS NOTES
Requested Prescriptions     Signed Prescriptions Disp Refills   • temazepam (RESTORIL) 30 MG capsule 30 Cap 0     Sig: Take 1 Cap by mouth at bedtime as needed for Sleep for up to 30 days.     GIFTY Chou.

## 2019-05-24 DIAGNOSIS — F41.9 ANXIETY: ICD-10-CM

## 2019-05-24 RX ORDER — ALPRAZOLAM 2 MG/1
2 TABLET ORAL
Qty: 30 TAB | Refills: 0 | Status: SHIPPED
Start: 2019-05-26 | End: 2019-06-21 | Stop reason: SDUPTHER

## 2019-05-24 NOTE — TELEPHONE ENCOUNTER
Was the patient seen in the last year in this department? Yes LOV 08/20/18 LABS 07/16/18    Does patient have an active prescription for medications requested? No     Received Request Via: Patient     AUBRIE CLARK     Age: 63  demographics  Data as of: 05/24/2019              NARCOTIC 250        SEDATIVE 502       STIMULANT 000       NARxSCORES can range from 000 to 999. The first two digits represent the composite percentile risk based on an overall analysis of prescription drug use. The third digit represents the number of active prescriptions. The distribution of scores in the population is such that approximately 75% fall below 200, 95% fall below 500 and 99% fall below 650. The information on this report is not warranted as accurate or complete. This report is based on the search criteria supplied and the data entered by the dispensing pharmacy. For more information about any prescription, please contact the dispensing pharmacy or the prescriber. NARxSCORES and Reports are intended to aid, not replace medical decision making. None of the information presented should be used as sole justification for providing or refusing to provide medications.       Rx Graph Grayed out drugs could not be included in score calculations.   [x] Narcotic [x] Sedative [x] Stimulant [x] Buprenorphine [x] Other    Created with Raphaël 2.2.0All Prescribers  Created with Raphaël 2.2.8Lequgthh67/[id number] - Molly Sánchez2 - Jeet Tates3 - Rogerio Meade C4 - Ryne Harta5 - Jono Augustin  Morphine MgEq (MME)  Created with Raphaël [id number]  Created with Raphaël 2.2.3Gfqrofjh16/[id number]  Lorazepam MgEq (LME)  Created with Raphaël 2.2.5530873  Created with Raphaël 2.2.8Zmvwcjer94/245f3w8z0x  *Per CDC guidance, the MME conversion factors prescribed or provided as part of the medication-assisted treatment for opioid use disorder should not be used to benchmark against dosage thresholds meant for opioids  "prescribed for pain. Buprenorphine products have no agreed upon morphine equivalency, and as partial opioid agonists, are not expected to be associated with overdose risk in the same dose-dependent manner as doses for full agonist opioids. MME = morphine milligram equivalents. LME = Lorazepam milligram equivalents. mg = dose in milligrams.     Data Analysis   Narcotics (250) 2 months 6 months 1 year 2 years   Prescribers (narcotic, sedative) 3  51 3  32 4  30 5  26   Pharmacies (narcotic, sedative) 2  45 2  31 2  25 3  27   Morphine mg 60  10 60  13 60  13 60  12   Morphine overlap (1) 0  0 0  0 0  0 0  0           Sedatives (502) 2 months 6 months 1 year 2 years   Prescribers (narcotic, sedative) 3  51 3  32 4  30 5  26   Pharmacies (narcotic, sedative) 2  45 2  31 2  25 3  27   Sedative mg 330  91 990  96 2220  97 5725  99   Sedative overlap (1) 17  33 17  16 18  11 18  9           Stimulants (000)  2 months 6 months 1 year 2 years   Prescribers (stimulant) 0  0 0  0 0  0 0  0   Pharmacies (stimulant) 0  0 0  0 0  0 0  0   Stimulant days 0  0 0  0 0  0 0  0   Stimulant overlap (1) 0  0 0  0 0  0 0  0      (1) Number of days for which a simliar type of medication was prescribed from different prescribers for use on the same day.         Summary   Total Prescriptions: 55   Total Prescribers: 5   Total Pharmacies: 3   Narcotics*    (excluding buprenorphine)  Current Qty: 0   Current MME/day: 0.00   30 Day Avg MME/day: 0.00   Buprenorphine*   Current Qty: 0   Current mg/day: 0.00   30 Day Avg mg/day: 0.00   Sedatives*     Temazepam 30 Mg Capsule : 21\"> Current Qty: 21   Current LME/day: 1.50    30 Day Avg LME/day: 1.30      *Highlighted drugs could not be included in score calculations   PRESCRIPTIONS  Total Prescriptions: 55   Total Private Pay: 2   Fill Date ID Written Drug Qty Days Prescriber Rx # Pharmacy Refill Daily Dose * Pymt Type    05/16/2019  1   05/16/2019 "  Temazepam 30 MG Capsule  30 30 Lo Niranjan  [id number]   [facility] ([facility]  0 1.50 [place]   04/26/2019  1   04/26/2019  Alprazolam 2 MG Tablet  30 30 Mo Bebeto  [id number]   [facility] (2500[place]  0 4.00 [place]   04/04/2019  1   04/03/2019  Temazepam 15 MG Capsule  60 30 Lo Niranjan  [id number]   [facility] (2500)  0 1.50 [place]   04/01/2019  2   04/01/2019  Hydrocodone-Acetamin 5-325 MG  12 2 Ch Cor  [id number]   Anirudh (4820)  0 30.00 M[place]   03/28/2019  1   03/27/2019  Alprazolam 2 MG Tablet  30 30 Lo Niranjan  [id number]   [facility] (2500[facility]  [place] 4.00 [place]   03/04/2019  1   03/04/2019  Temazepam 15 MG Capsule  60 30 Lo Niranjan  [id number]   [facility] (2500[place]  [place] 1.50 [place]   02/26/2019  1   02/26/2019  Alprazolam 2 MG Tablet  30 30 Lo Niranjan  [id number]   [facility] (2500[place]  [place] 4.00 [place]   02/01/2019  1   01/24/2019  Temazepam 15 MG Capsule  60 30 Lo Niranjan  [id number]   [facility] (2500[place]  [place] 1.50 [place]   01/25/2019  1   01/25/2019  Alprazolam 2 MG Tablet  30 30 Lo Niranjan  [id number]   [facility] (2500[place]  [place] 4.00 [place]   12/26/2018  1   12/19/2018  Alprazolam 2 MG Tablet  30 30 Lo Niranjan  [id number]   [facility] (2500[place]  [place] 4.00 [place]   12/26/2018  1   12/19/2018  Temazepam 30 MG Capsule  30 30 Lo Niranjan  [id number]   [facility] (2500[place]  [place] 1.50 [place]   11/26/2018  1   08/20/2018  Alprazolam 2 MG Tablet  30 30 Lo Niranjan  [id number]   [facility] (2500[place]  3 4.00 [place]   11/26/2018  1   08/20/2018  Temazepam 30 MG Capsule  30 30 Lo Niranjan  [id number]   [facility] (2500)  3 1.50 [place]   10/25/2018  1   08/20/2018  Alprazolam 2 MG Tablet  30 30 Lo Niranjan  [id number]   [facility] (2500[place]  2 4.00 [place]   10/25/2018  1   08/20/2018  Temazepam 30 MG Capsule  30 30 Lo Niranjan  [id number]   [facility] (2500)  2 1.50 [place]   10/23/2018  1   10/23/2018  Estrogen-Methyltestos H.S. Tab  90 90 Lo Niranjan  [id number]   [facility] (2500[place]  0  [place]   09/24/2018  1   08/20/2018  Alprazolam 2 MG Tablet  30 30 Lo Niranjan  [id number]   [facility] (2500)  1 4.00 [place]    09/24/2018  1   08/20/2018  Temazepam 30 MG Capsule  30 30 Lo Niranjan  [id number]   [facility] (2500)  1 1.50 [place]   08/24/2018  1   08/20/2018  Alprazolam 2 MG Tablet  30 30 Lo Niranjan  [id number]   [facility] (2500)  0 4.00 [place]   08/24/2018  1   08/20/2018  Temazepam 30 MG Capsule  30 30 Lo Niranjan  [id number]   [facility] (2500)  0 1.50 L[place]   07/26/2018  1   07/26/2018  Alprazolam 2 MG Tablet  30 30 Lo Niranjan  [id number]   [facility] (2500)  0 4.00 L[place]   07/26/2018  1   07/26/2018  Temazepam 30 MG Capsule  30 30 Lo Niranjan  [id number]   [facility] (2500)  0 1.50 [place]   07/20/2018  1   07/20/2018  Estrogen-Methyltestos H.S. Tab  90 90 Lo Niranjan  [id number]   [facility] [place]2500[place]  0  [place]   06/27/2018  1   04/30/2018  Alprazolam 2 MG Tablet  60 30 Lugo [place]  [id number]   [facility] (2500)  2 8.00 L[place]   06/26/2018  1   04/30/2018  Temazepam 30 MG Capsule  30 30 Lugo [place]  [id number]   [facility] (2500)  2 1.50 L[place]   05/29/2018  1   04/30/2018  Alprazolam 2 MG Tablet  60 30 Lugo [place]  [id number]   [facility] (2500)  1 8.00 L[place]   05/29/2018  1   04/30/2018  Temazepam 30 MG Capsule  30 30 Lugo [place]  [id number]   [facility] (2500)  1 1.50 L[place]   04/30/2018  1   04/30/2018  Temazepam 30 MG Capsule  30 30 Lguo [place]  [id number]   [facility] (2500)  0 1.50 L[place]   04/30/2018  1   04/30/2018  Alprazolam 2 MG Tablet  60 30 Lugo [place]  [id number]   [facility] (2500)  0 8.00 L[place]   04/27/2018  1   04/27/2018  Alprazolam 2 MG Tablet  10 10 Lugo [place]  [id number]   [facility] (2500)  0 4.00 L[place]   04/05/2018  1   01/29/2018  Temazepam 30 MG Capsule  30 30 Lugo [place]  [id number]   [facility] (2500)  2 1.50 L[place]   03/19/2018  1   01/29/2018  Alprazolam 2 MG Tablet  60 30 Lugo [place]  [id number]   [facility] (2500)  1 8.00 L[place]   03/07/2018  1   01/29/2018  Temazepam 30 MG Capsule  30 30 Lugo [place]  [id number]   [facility] (2500)  1 1.50 L[place]   02/07/2018  1   01/29/2018  Temazepam 30 MG Capsule  30 30 Lugo [place]  [id number]   [facility] (2500)  0 1.50 L[place]   02/07/2018  1   01/29/2018  Alprazolam 2 MG Tablet  60 30 Lugo [place]  [id number]   [facility] (2500)  0 8.00 L[place]   01/08/2018  1   12/11/2017  Temazepam 30 MG Capsule  30 30 Maria T Hor  [id number]   [facility] (2500)  1 1.50 L[place]   01/08/2018  1   12/11/2017  Alprazolam 2 MG Tablet  60 30 Maria T Hor  [id number]   [facility] (2500)  1 8.00 [place]   12/11/2017  1   12/11/2017  Temazepam 30 MG Capsule  30 30 Maria T Hor  [id number]   [facility] (2500)  0 1.50 [place]   12/11/2017  1   12/11/2017  Alprazolam 2 MG Tablet  60 30 Maria T Hor  [id number]   [facility] (2500)  0 8.00 [place]   12/05/2017  2   11/03/2017  Eemt Hs 0.625-1.25 MG Tablet  30 30 Maria T Hor  [id number]   [facility] (6484[place]  1  [place]   11/14/2017  2   11/03/2017  Alprazolam 2 MG Tablet  60 30 Maria T Hor  [id number]   [facility] (6484)  0 8.00 L[place]   11/14/2017  2   11/03/2017  Temazepam 30 MG Capsule  30 30 Maria T Hor  [id number]   [facility] (6484)  0 1.50 L[place]   11/06/2017  2   11/03/2017  Eemt Hs 0.625-1.25 MG Tablet  30 30 Maria T Hor  [id number]   [facility] [place]6484[place]  [place]   10/17/2017  1   10/17/2017  Temazepam 30 MG Capsule  30 30 Maria T Hor  [id number]   [facility] (2500)  0 1.50 L[place]   10/17/2017  1   10/17/2017  Alprazolam 2 MG Tablet  60 30 Maria T Hor  [id number]   [facility] (2500)  0 8.00 L[place]   09/18/2017  1   07/20/2017  Alprazolam 2 MG Tablet  60 30 Maria T Hor  [id number]   [facility] (2500)  2 8.00 L[place]   09/18/2017  1   07/20/2017  Temazepam 30 MG Capsule  30 30 Maria T Hor  [id number]   [facility] (2500)  2 1.50 L[place]   08/17/2017  2   07/20/2017  Temazepam 30 MG Capsule  30 30 Maria T Hor  [id number]   [facility] (2500)  1 1.50 L[place]   08/17/2017  2   07/20/2017  Alprazolam 2 MG Tablet  60 30 Maria T Hor  [id number]   [facility] (2500)  1 8.00 L[place]   07/21/2017  2   07/20/2017  Temazepam 30 MG Capsule  30 30 Maria T Hor  [id number]   [facility] (2500)  0 1.50 L[place]   07/21/2017  2   07/20/2017  Alprazolam 2 MG Tablet  60 30 Maria T Hor  [id number]   [facility] (2500)   0 8.00 L[place]   06/24/2017  2   06/23/2017  Alprazolam 2 MG Tablet  60 30 Maria T Hor  [id number]   [facility] (2500)  0 8.00 L[place]   06/24/2017  2   06/23/2017  Temazepam 30 MG Capsule  30 30 Maria T Hor  [id number]   [facility] (2500)  0 1.50 L[place]   05/28/2017  2   05/01/2017  Temazepam 30 MG Capsule  30 30 Maria T Hor  [id number]   [facility] (2500)  1 1.50 L[place]   05/28/2017  2   05/01/2017  Alprazolam 2 MG Tablet  60 30 Maria T Hor  [id number]   [facility] (2500)  1 8.00 L[place]   *Per CDC guidance, the MME conversion factors prescribed or provided as part of the medication-assisted treatment for opioid use disorder should not be used to benchmark against dosage thresholds meant for opioids prescribed for pain. Buprenorphine products have no agreed upon morphine equivalency, and as partial opioid agonists, are not expected to be associated with overdose risk in the same dose-dependent manner as doses for full agonist opioids. MME = morphine milligram equivalents. LME = Lorazepam milligram equivalents. MG = dose in milligrams.   PROVIDERS  Total Providers: 5   Name Address City State Zipcode Phone   Ryne Quinteros [address] [facility] [address] [place] [address]    J[place]ir [address] [facility] [place] [address]    Molly Sánchez [address] [facility] [place] [address]    Jeet Oliveira [address] [facility] [place] [address]    Rogerio Pabon,  [address] [facility] [place] [address]    PHARMACIES  Total Pharmacies: 3   Name Address City State Zipcode Phone   [facility]  [phone] [address] [place] [address] [phone]   [facility] [phone]) [address] [place] [address] [phone]   [id number] [phone]) [address] [place] [address] [phone]

## 2019-05-28 ENCOUNTER — PATIENT MESSAGE (OUTPATIENT)
Dept: MEDICAL GROUP | Facility: PHYSICIAN GROUP | Age: 64
End: 2019-05-28

## 2019-06-13 DIAGNOSIS — F51.04 PSYCHOPHYSIOLOGICAL INSOMNIA: ICD-10-CM

## 2019-06-13 RX ORDER — TEMAZEPAM 30 MG/1
30 CAPSULE ORAL NIGHTLY PRN
Qty: 30 CAP | Refills: 0 | Status: SHIPPED
Start: 2019-06-13 | End: 2019-07-09 | Stop reason: SDUPTHER

## 2019-06-13 NOTE — TELEPHONE ENCOUNTER
Requested Prescriptions     Signed Prescriptions Disp Refills   • temazepam (RESTORIL) 30 MG capsule 30 Cap 0     Sig: Take 1 Cap by mouth at bedtime as needed for Sleep for up to 30 days.     Authorizing Provider: MINERVA LAUREANO A.P.R.N.

## 2019-06-13 NOTE — TELEPHONE ENCOUNTER
[facility]cription faxed to:    [facility]  MINERVA HASSAN - [address]  [address] [facility] [address]  Phone: [phone] Fax: [phone]  .

## 2019-06-21 DIAGNOSIS — F41.9 ANXIETY: ICD-10-CM

## 2019-06-21 NOTE — TELEPHONE ENCOUNTER
From: Lupe Thompson  Sent: 6/21/2019 1:45 PM PDT  Subject: Medication Renewal Request    Lupe Thompson would like a refill of the following medications:     alprazolam (XANAX) 2 MG tablet [eJet Oliveira, A.P.R.N.]    Preferred pharmacy: [address]

## 2019-06-21 NOTE — TELEPHONE ENCOUNTER
Was the patient seen in the last year in this department? Yes LOV 08/20/18 next makayla. 08/19/19 LABS 07/16/18    Does patient have an active prescription for medications requested? No     Received Request Via: Patient

## 2019-06-24 RX ORDER — ALPRAZOLAM 2 MG/1
2 TABLET ORAL
Qty: 30 TAB | Refills: 0 | Status: SHIPPED
Start: 2019-06-24 | End: 2019-07-22 | Stop reason: SDUPTHER

## 2019-06-24 NOTE — TELEPHONE ENCOUNTER
[facility] to:    [facility]  MINERVA HASSAN - [address]  [address] [facility] [address]  Phone: [phone] Fax: [phone]  .

## 2019-07-09 DIAGNOSIS — F51.04 PSYCHOPHYSIOLOGICAL INSOMNIA: ICD-10-CM

## 2019-07-09 RX ORDER — TEMAZEPAM 30 MG/1
30 CAPSULE ORAL NIGHTLY PRN
Qty: 30 CAP | Refills: 0 | Status: SHIPPED
Start: 2019-07-13 | End: 2019-08-07 | Stop reason: SDUPTHER

## 2019-07-09 NOTE — TELEPHONE ENCOUNTER
Was the patient seen in the last year in this department? Yes LOV 08/20/18 LABS 07/1618    Does patient have an active prescription for medications requested? No     Received Request Via: Patient       AUBRIE CLARK     Age: 63  demographics  Data as of: 07/09/2019              NARCOTIC 230        SEDATIVE 532       STIMULANT 000       NARxSCORES can range from 000 to 999. The first two digits represent the composite percentile risk based on an overall analysis of prescription drug use. The third digit represents the number of active prescriptions. The distribution of scores in the population is such that approximately 75% fall below 200, 95% fall below 500 and 99% fall below 650. The information on this report is not warranted as accurate or complete. This report is based on the search criteria supplied and the data entered by the dispensing pharmacy. For more information about any prescription, please contact the dispensing pharmacy or the prescriber. NARxSCORES and Reports are intended to aid, not replace medical decision making. None of the information presented should be used as sole justification for providing or refusing to provide medications.       Rx Graph Grayed out drugs could not be included in score calculations.   [x] Narcotic [x] Sedative [x] Stimulant [x] Buprenorphine [x] Other    Created with Raphaël 2.2.0All Prescribers  Created with Raphaël [id number] - Molly Sánchez2 - Jeet Tates3 - Rogerio Meade C4 - Ryne Harta5 - Jono Augustin  Morphine MgEq (MME)  Created with Raphaël [id number]  Created with Raphaël 2.2.8Kgbuztea49/[id number]  Lorazepam MgEq (LME)  Created with Raphaël 2.2.8787801  Created with Raphaël 2.2.1Rrgzvhcn32/[id number]  *Per CDC guidance, the MME conversion factors prescribed or provided as part of the medication-assisted treatment for opioid use disorder should not be used to benchmark against dosage thresholds meant for opioids  "prescribed for pain. Buprenorphine products have no agreed upon morphine equivalency, and as partial opioid agonists, are not expected to be associated with overdose risk in the same dose-dependent manner as doses for full agonist opioids. MME = morphine milligram equivalents. LME = Lorazepam milligram equivalents. mg = dose in milligrams.     Data Analysis   Narcotics (230) 2 months 6 months 1 year 2 years   Prescribers (narcotic, sedative) 2  36 3  32 3  23 5  26   Pharmacies (narcotic, sedative) 1  25 2  31 2  25 3  27   Morphine mg 0  0 60  13 60  13 60  12   Morphine overlap (1) 0  0 0  0 0  0 0  0           Sedatives (532) 2 months 6 months 1 year 2 years   Prescribers (narcotic, sedative) 2  36 3  32 3  23 5  26   Pharmacies (narcotic, sedative) 1  25 2  31 2  25 3  27   Sedative mg 330  91 945  96 1935  96 5440  99   Sedative overlap (1) 38  65 46  37 47  26 47  21           Stimulants (000)  2 months 6 months 1 year 2 years   Prescribers (stimulant) 0  0 0  0 0  0 0  0   Pharmacies (stimulant) 0  0 0  0 0  0 0  0   Stimulant days 0  0 0  0 0  0 0  0   Stimulant overlap (1) 0  0 0  0 0  0 0  0      (1) Number of days for which a simliar type of medication was prescribed from different prescribers for use on the same day.         Summary   Total Prescriptions: 54   Total Prescribers: 5   Total Pharmacies: 3   Narcotics*    (excluding buprenorphine)  Current Qty: 0   Current MME/day: 0.00   30 Day Avg MME/day: 0.00   Buprenorphine*   Current Qty: 0   Current mg/day: 0.00   30 Day Avg mg/day: 0.00   Sedatives*     Alprazolam 2 Mg Tablet : 14  Temazepam 30 Mg Capsule : 3\"> Current Qty: 17   Current LME/day: 5.50    30 Day Avg LME/day: 5.47      *Highlighted drugs could not be included in score calculations   PRESCRIPTIONS  Total Prescriptions: 54   Total Private Pay: 2   Fill Date ID Written Drug Qty Days Prescriber Rx # Pharmacy Refill Daily Dose * Pymt Type  "   06/24/2019  2   06/24/2019  Alprazolam 2 MG Tablet  30 30 Lo Niranjan  [id number]   [facility] ([facility]  [place]  4.00 [place]   06/13/2019  2   06/13/2019  Temazepam 30 MG Capsule  30 30 Lo Niranjan  [id number]   [facility] (2500)  [place]  1.50 [place]   05/24/2019  2   05/24/2019  Alprazolam 2 MG Tablet  30 30 Mo [place]  [id number]   [facility] (2500)  [place]  4.00 [place]   05/16/2019  2   05/16/2019  Temazepam 30 MG Capsule  30 30 Lo Niranjan  [id number]   [facility] (2500[place]  [place]  1.50 [place]   04/26/2019  2   04/26/2019  Alprazolam 2 MG Tablet  30 30 Mo [place]  [id number]   [facility] (2500)  [place]  4.00 [place]   04/04/2019  2   04/03/2019  Temazepam 15 MG Capsule  60 30 Lo Niranjan  [id number]   [facility] (2500[place]  [place]  1.50 [place]   04/01/2019  1   04/01/2019  Hydrocodone-Acetamin 5-325 MG  12 2 Ch Cor  [id number]   Anirudh (4820)  0  30.00 M[place]   03/28/2019  2   03/27/2019  Alprazolam 2 MG Tablet  30 30 Lo Niranjan  [id number]   [facility] (2500)  [place]  4.00 [place]   03/04/2019  2   03/04/2019  Temazepam 15 MG Capsule  60 30 Lo Niranjan  [id number]   [facility] (2500[place]  [place]  1.50 [place]   02/26/2019  2   02/26/2019  Alprazolam 2 MG Tablet  30 30 Lo Niranjan  [id number]   [facility] (2500[place]  [place]  4.00 [place]   02/01/2019  2   01/24/2019  Temazepam 15 MG Capsule  60 30 Lo Niranjan  [id number]   [facility] (2500[place]  [place]  1.50 [place]   01/25/2019  2   01/25/2019  Alprazolam 2 MG Tablet  30 30 Lo Niranjan  [id number]   [facility] (2500[place]  [place]  4.00 [place]   12/26/2018  2   12/19/2018  Alprazolam 2 MG Tablet  30 30 Lo Niranjan  [id number]   [facility] (2500)  [place]  4.00 [place]   12/26/2018  2   12/19/2018  Temazepam 30 MG Capsule  30 30 Lo Niranjan  [id number]   [facility] (2500)  [place]  1.50 [place]   11/26/2018  2   08/20/2018  Alprazolam 2 MG Tablet  30 30 Lo Niranjan  [id number]   [facility] (2500)  3  4.00 [place]   11/26/2018  2   08/20/2018  Temazepam 30 MG Capsule  30 30 Lo Niranjan  [id number]   [facility] (2500)  3  1.50 L[place]   10/25/2018  2   08/20/2018  Alprazolam 2 MG Tablet  30 30 Lo Niranjan  [id number]    [facility] (2500)  2  4.00 [place]   10/25/2018  2   08/20/2018  Temazepam 30 MG Capsule  30 30 Lo Niranjan  [id number]   [facility] (2500)  2  1.50 [place]   10/23/2018  2   10/23/2018  Estrogen-Methyltestos H.S. Tab  90 90 Lo Niranjan  [id number]   [facility] (2500[place]  [place]   [place]   09/24/2018  2   08/20/2018  Alprazolam 2 MG Tablet  30 30 Lo Niranjan  [id number]   [facility] (2500)  1  4.00 L[place]   09/24/2018  2   08/20/2018  Temazepam 30 MG Capsule  30 30 Lo Niranjan  [id number]   [facility] (2500)  1  1.50 L[place]   08/24/2018  2   08/20/2018  Alprazolam 2 MG Tablet  30 30 Lo Niranjan  [id number]   [facility] (2500)  0  4.00 L[place]   08/24/2018  2   08/20/2018  Temazepam 30 MG Capsule  30 30 Lo Niranjan  [id number]   [facility] (2500)  0  1.50 L[place]   07/26/2018  2   07/26/2018  Alprazolam 2 MG Tablet  30 30 Lo Niranjan  [id number]   [facility] (2500)  0  4.00 L[place]   07/26/2018  2   07/26/2018  Temazepam 30 MG Capsule  30 30 Lo Niranjan  [id number]   [facility] (2500)  0  1.50 L[place]   07/20/2018  2   07/20/2018  Estrogen-Methyltestos H.S. Tab  90 90 Lo Niranjan  [id number]   [facility] (2500[place]  [place]   [place]   06/27/2018  2   04/30/2018  Alprazolam 2 MG Tablet  60 30 Lugo Michael  [id number]   [facility] (2500)  2  8.00 L[place]   06/26/2018  2   04/30/2018  Temazepam 30 MG Capsule  30 30 Lugo Michael  [id number]   [facility] (2500)  2  1.50 L[place]   05/29/2018  2   04/30/2018  Alprazolam 2 MG Tablet  60 30 Lugo Michael  [id number]   [facility] (2500)  1  8.00 L[place]   05/29/2018  2   04/30/2018  Temazepam 30 MG Capsule  30 30 Lugo Michael  [id number]   [facility] (2500)  1  1.50 L[place]   04/30/2018  2   04/30/2018  Temazepam 30 MG Capsule  30 30 Lugo Michael  [id number]   [facility] (2500)  0  1.50 L[place]   04/30/2018  2   04/30/2018  Alprazolam 2 MG Tablet  60 30 Lugo Michael  [id number]   [facility] (2500)  0  8.00 L[place]   04/27/2018  2   04/27/2018  Alprazolam 2 MG Tablet  10 10 Lugo Michael  [id number]   [facility] (2500)  0  4.00 L[place]   04/05/2018  2   01/29/2018  Temazepam 30 MG  Capsule  30 30 Lugo Michael  [id number]   [facility] (2500)  2  1.50 L[place]   03/19/2018  2   01/29/2018  Alprazolam 2 MG Tablet  60 30 Lugo Michael  [id number]   [facility] (2500)  1  8.00 L[place]   03/07/2018  2   01/29/2018  Temazepam 30 MG Capsule  30 30 Lugo Michael  [id number]   [facility] (2500)  1  1.50 L[place]   02/07/2018  2   01/29/2018  Temazepam 30 MG Capsule  30 30 Lugo Michael  [id number]   [facility] (2500)  0  1.50 L[place]   02/07/2018  2   01/29/2018  Alprazolam 2 MG Tablet  60 30 Lugo Michael  [id number]   [facility] (2500)  0  8.00 L[place]   01/08/2018  2   12/11/2017  Temazepam 30 MG Capsule  30 30 Maria T Hor  [id number]   [facility] (2500)  1  1.50 L[place]   01/08/2018  2   12/11/2017  Alprazolam 2 MG Tablet  60 30 Maria T Hor  [id number]   [facility] (2500)  1  8.00 L[place]   12/11/2017  2   12/11/2017  Temazepam 30 MG Capsule  30 30 Maria T Hor  [id number]   [facility] (2500)  0  1.50 L[place]   12/11/2017  2   12/11/2017  Alprazolam 2 MG Tablet  60 30 Mraia T Hor  [id number]   [facility] (2500)  0  8.00 L[place]   12/05/2017  1   11/03/2017  Eemt Hs 0.625-1.25 MG Tablet  30 30 Maria T Hor  [id number]   [facility] (6484[place]  1   [place]   11/14/2017  1   11/03/2017  Alprazolam 2 MG Tablet  60 30 Maria T Hor  [id number]   Wal (6484)  0  8.00 L[place]   11/14/2017  1   11/03/2017  Temazepam 30 MG Capsule  30 30 Maria T Hor  [id number]   Wal (6484)  0  1.50 L[place]   11/06/2017  1   11/03/2017  Eemt Hs 0.625-1.25 MG Tablet  30 30 Maria T Hor  [id number]   [facility] (6484[place]  [place]   [place]   10/17/2017  2   10/17/2017  Temazepam 30 MG Capsule  30 30 Maria T Hor  [id number]   [facility] (2500)  0  1.50 L[place]   10/17/2017  2   10/17/2017  Alprazolam 2 MG Tablet  60 30 Maria T Hor  [id number]   [facility] (2500)  0  8.00 L[place]   09/18/2017  2   07/20/2017  Alprazolam 2 MG Tablet  60 30 Maria T Hor  [id number]   [facility] (2500)  2  8.00 L[place]   09/18/2017  2   07/20/2017  Temazepam 30 MG Capsule  30 30 Maria T Hor  [id number]   Wal (2500)  2  1.50 L[place]   08/17/2017  1    07/20/2017  Temazepam 30 MG Capsule  30 30 Maria T Hor  [id number]   Wal (2500)  1  1.50 L[place]   08/17/2017  1   07/20/2017  Alprazolam 2 MG Tablet  60 30 Maria T Hor  [id number]   Wal (2500)  1  8.00 L[place]   07/21/2017  1   07/20/2017  Temazepam 30 MG Capsule  30 30 Maria T Hor  [id number]   [facility] (2500)  0  1.50 L[place]   07/21/2017  1   07/20/2017  Alprazolam 2 MG Tablet  60 30 Maria T Hor  [id number]   [facility] (2500)  0  8.00 L[place]   *Per CDC guidance, the MME conversion factors prescribed or provided as part of the medication-assisted treatment for opioid use disorder should not be used to benchmark against dosage thresholds meant for opioids prescribed for pain. Buprenorphine products have no agreed upon morphine equivalency, and as partial opioid agonists, are not expected to be associated with overdose risk in the same dose-dependent manner as doses for full agonist opioids. MME = morphine milligram equivalents. LME = Lorazepam milligram equivalents. MG = dose in milligrams.   PROVIDERS  Total Providers: 5   Name Address City State Zipcode Phone   Jono Ortezgan [address] [place] [address]    [facility] [address] [place] [address]    Molly Sánchez [address] [facility] [place] [address]    Rogerio Pabon,  [address] [place] [address]    Jeet Oliveira [address] [facility] [address] [facility] [place] [address]    PHARMACIES  Total Pharmacies: 3   Name Address City State Zipcode Phone   [facility]  [phone] [place] [address] [phone]   [facility] [phone] : [facility] [place] [address] [phone]   [facility] [phone]) [phone] [place] [address] [phone]

## 2019-07-10 NOTE — TELEPHONE ENCOUNTER
Prescription faxed to:    [facility]  MINERVA HASSAN - [address]  [address] [facility] [address]  Phone: [phone] Fax: [phone]  .

## 2019-08-07 DIAGNOSIS — F51.04 PSYCHOPHYSIOLOGICAL INSOMNIA: ICD-10-CM

## 2019-08-07 RX ORDER — TEMAZEPAM 30 MG/1
30 CAPSULE ORAL NIGHTLY PRN
Qty: 30 CAP | Refills: 0 | Status: SHIPPED
Start: 2019-08-07 | End: 2019-09-06 | Stop reason: SDUPTHER

## 2019-08-07 NOTE — TELEPHONE ENCOUNTER
Requested Prescriptions     Signed Prescriptions Disp Refills   • temazepam (RESTORIL) 30 MG capsule 30 Cap 0     Sig: Take 1 Cap by mouth at bedtime as needed for Sleep for up to 30 days.     Authorizing Provider: MINERVA LAUREANO for UDS.  Has upcoming appointmet  GIFTY Chou.

## 2019-08-07 NOTE — TELEPHONE ENCOUNTER
Was the patient seen in the last year in this department? Yes 8/20/18  Next appointment: 8/19/19    Does patient have an active prescription for medications requested? No     Received Request Via: Patient

## 2019-08-07 NOTE — TELEPHONE ENCOUNTER
Prescription faxed to:    [facility]  MINERVA HASSAN - [address]  [address] [facility] [address]  Phone: [phone] Fax: [phone]  .

## 2019-08-19 ENCOUNTER — OFFICE VISIT (OUTPATIENT)
Dept: MEDICAL GROUP | Facility: PHYSICIAN GROUP | Age: 64
End: 2019-08-19
Payer: COMMERCIAL

## 2019-08-19 VITALS
HEIGHT: 64 IN | BODY MASS INDEX: 23.05 KG/M2 | DIASTOLIC BLOOD PRESSURE: 72 MMHG | HEART RATE: 83 BPM | TEMPERATURE: 98.2 F | RESPIRATION RATE: 14 BRPM | WEIGHT: 135 LBS | OXYGEN SATURATION: 93 % | SYSTOLIC BLOOD PRESSURE: 128 MMHG

## 2019-08-19 DIAGNOSIS — F41.9 ANXIETY: ICD-10-CM

## 2019-08-19 DIAGNOSIS — Z12.39 SCREENING FOR BREAST CANCER: ICD-10-CM

## 2019-08-19 DIAGNOSIS — Z78.0 POST-MENOPAUSAL: ICD-10-CM

## 2019-08-19 DIAGNOSIS — F51.04 PSYCHOPHYSIOLOGICAL INSOMNIA: ICD-10-CM

## 2019-08-19 PROCEDURE — 99397 PER PM REEVAL EST PAT 65+ YR: CPT | Performed by: NURSE PRACTITIONER

## 2019-08-19 RX ORDER — ALPRAZOLAM 2 MG/1
2 TABLET ORAL
Qty: 30 TAB | Refills: 0 | Status: SHIPPED
Start: 2019-08-19 | End: 2019-09-17 | Stop reason: SDUPTHER

## 2019-08-20 NOTE — PROGRESS NOTES
"Chief Complaint   Patient presents with   • Annual Exam   • Orders Needed     mammo        Subjective:   Lupe Thompson is a 63 y.o. female here today for annual exam and UDS    Psychophysiological insomnia  Due for UDS today for this. Temazepam most nights.      Anxiety  Currently a lot of stress at work.  Taking 1/2 tab most days.  Last dose was yesterday         Current medicines (including changes today)  Current Outpatient Medications   Medication Sig Dispense Refill   • alprazolam (XANAX) 2 MG tablet Take 1 Tab by mouth 1 time daily as needed for Anxiety for up to 30 days. 30 Tab 0   • temazepam (RESTORIL) 30 MG capsule Take 1 Cap by mouth at bedtime as needed for Sleep for up to 30 days. 30 Cap 0   • Ferrous Sulfate (IRON CR PO) Take 1 Tab by mouth every day.     • Multiple Vitamin (MULTIVITAMIN PO) Take 1 Tab by mouth every day.     • CALCIUM PO Take 1 Tab by mouth 3 times a day.     • VITAMIN D PO Take 2 Tabs by mouth 2 Times a Day.       No current facility-administered medications for this visit.      She  has a past medical history of Anxiety, Arthritis, Breast cancer (HCC), Cancer ([facility]) (1993), Dental disorder, Depression, and Other specified symptom associated with female genital organs. She also has no past medical history of Hypertension.    ROS as stated in hpi  No chest pain, no shortness of breath, no abdominal pain       Objective:     /72   Pulse 83   Temp 36.8 °C (98.2 °F) (Temporal)   Resp 14   Ht 1.619 m (5' 3.75\")   Wt 61.2 kg (135 lb)   SpO2 93%  Body mass index is 23.35 kg/m².   Physical Exam:  Constitutional: Alert, no distress.  Skin: Warm, dry, good turgor,no cyanosis, no rashes in visible areas.  Eye: Equal, round and reactive, conjunctiva clear, lids normal.  Ears: No tenderness, no discharge.  External canals are without any significant edema or erythema.  Tympanic membranes are without any inflammation, no effusion.  Gross auditory acuity is intact.  Nose: " symmetrical without tenderness, no discharge.  Mouth/Throat: lips without lesion.  Oropharynx clear.  Throat without erythema, exudates or tonsillar enlargement.  Neck: Trachea midline, no masses, no obvious thyroid enlargement.. No cervical or supraclavicular lymphadenopathy. Range of motion within normal limits.  Neuro: Cranial nerves 2-12 grossly intact.  No sensory deficit.  Respiratory: Unlabored respiratory effort, lungs clear to auscultation, no wheezes, no ronchi.  Cardiovascular: Normal S1, S2, no murmur, no edema.  Abdomen: Soft, non-tender, no masses, no guarding,  no hepatosplenomegaly.  Psych: Alert and oriented x3, normal affect and mood and judgement.        Assessment and Plan:   The following treatment plan was discussed    1. Anxiety  Chronic, ongoing. Stable.  Is workiing to decrease usage.  Refill provided today.  UDS ordered.  No discrepancies noted.  Monitor and follow.   - alprazolam (XANAX) 2 MG tablet; Take 1 Tab by mouth 1 time daily as needed for Anxiety for up to 30 days.  Dispense: 30 Tab; Refill: 0  - Pain Management Screen; Future  - Controlled Substance Treatment Agreement    2. Psychophysiological insomnia  Chronic, ongoing. Stable.  Temazepam is helping.  UDS due today.  No discrepancies  Monitor and follow.   - Pain Management Screen; Future  - Controlled Substance Treatment Agreement    3. Post-menopausal  Due for bone density.  Order provided. Monitor and follow.  Continue with vit d and calcium  - DS-BONE DENSITY STUDY (DEXA); Future    4. Screening for breast cancer  Due for mammogram.  Order provided.  Monitor and follow.   - MA-SCREEN MAMMO W/CAD-BILAT; Future      Followup: Return in about 1 year (around 8/19/2020) for Annual.         Educated in proper administration of medication(s) ordered today including safety, possible SE, risks, benefits, rationale and alternatives to therapy.     Please note that this dictation was created using voice recognition software. I have  made every reasonable attempt to correct obvious errors, but I expect that there are errors of grammar and possibly content that I did not discover before finalizing the note.

## 2019-09-06 DIAGNOSIS — F51.04 PSYCHOPHYSIOLOGICAL INSOMNIA: ICD-10-CM

## 2019-09-06 NOTE — TELEPHONE ENCOUNTER
Was the patient seen in the last year in this department? Yes LOV 08/19/19    Does patient have an active prescription for medications requested? No     Received Request Via: Pharmacy          AUBRIE CLARK     Age: 64  demographics  Data as of: 09/06/2019              NARCOTIC 220    Created with Raphaël [id number]   SEDATIVE 491       STIMULANT 000       NARxSCORES can range from 000 to 999. The first two digits represent the composite percentile risk based on an overall analysis of prescription drug use. The third digit represents the number of active prescriptions. The distribution of scores in the population is such that approximately 75% fall below 200, 95% fall below 500 and 99% fall below 650. The information on this report is not warranted as accurate or complete. This report is based on the search criteria supplied and the data entered by the dispensing pharmacy. For more information about any prescription, please contact the dispensing pharmacy or the prescriber. NARxSCORES and Reports are intended to aid, not replace medical decision making. None of the information presented should be used as sole justification for providing or refusing to provide medications.       RX GRAPH Grayed out drugs could not be included in score calculations.   [x] Narcotic [x] Sedative [x] Stimulant [x] Buprenorphine [x] Other    Created with Raphaël 2.2.0All Prescribers  Created with Raphaël 2.2.8Qhmsbixk11/[id number] - Molly Sánchez2 - Jeet Tates3 - Rogerio Meade C4 - Ryne Harta5 - Jono Augustin  Morphine MgEq (MME)  Created with Raphaël [id number]  Created with Raphaël 2.2.6Vmwrngnc54/[id number]  Lorazepam MgEq (LME)  Created with Raphaël 2.2.5080135  Created with Raphaël 2.2.1Invrppqi09/[id number]  *Per CDC guidance, the MME conversion factors prescribed or provided as part of the medication-assisted treatment for opioid use disorder should not be used to  benchmark against dosage thresholds meant for opioids prescribed for pain. Buprenorphine products have no agreed upon morphine equivalency, and as partial opioid agonists, are not expected to be associated with overdose risk in the same dose-dependent manner as doses for full agonist opioids. MME = morphine milligram equivalents. LME = Lorazepam milligram equivalents. mg = dose in milligrams.     Data Analysis   Narcotics (220) 2 months 6 months 1 year 2 years   Prescribers (narcotic, sedative) 1  19 3  32 3  23 5  26   Pharmacies (narcotic, sedative) 1  25 2  31 2  25 3  27   Morphine mg 0  0 60  13 60  13 60  12   Morphine overlap (1) 0  0 0  0 0  0 0  0           Sedatives (491) 2 months 6 months 1 year 2 years   Prescribers (narcotic, sedative) 1  19 3  32 3  23 5  26   Pharmacies (narcotic, sedative) 1  25 2  31 2  25 3  27   Sedative mg 330  91 945  96 1935  96 5200  99   Sedative overlap (1) 0  0 46  37 46  26 47  21           Stimulants (000)  2 months 6 months 1 year 2 years   Prescribers (stimulant) 0  0 0  0 0  0 0  0   Pharmacies (stimulant) 0  0 0  0 0  0 0  0   Stimulant days 0  0 0  0 0  0 0  0   Stimulant overlap (1) 0  0 0  0 0  0 0  0      (1) Number of days for which a simliar type of medication was prescribed from different prescribers for use on the same day.         Summary   Total Prescriptions: 54   Total Prescribers: 5   Total Pharmacies: 3   Narcotics*    (excluding buprenorphine)  Current Qty: 0   Current MME/day: 0.00   30 Day Avg MME/day: 0.00   Buprenorphine*   Current Qty: 0   Current mg/day: 0.00   30 Day Avg mg/day: 0.00   Sedatives*   Current Qty: 13   Current LME/day: 5.50   30 Day Avg LME/day: 5.55   *Highlighted drugs could not be included in score calculations   PRESCRIPTIONS  Total Prescriptions: 54   Total Private Pay: 2   Fill Date ID Written Drug Qty Days Prescriber Rx # Pharmacy Refill Daily Dose * Pymt Type    08/21/2019   1   08/19/2019  Alprazolam 2 MG Tablet  30.00 30 Lo Niranjan  [id number]   [facility] (2500[place]  [place]  4.00 [place]   08/07/2019  1   08/07/2019  Temazepam 30 MG Capsule  30.00 30 Lo Niranjan  [id number]   [facility] (2500)  0  1.50 [place]   07/22/2019  1   07/22/2019  Alprazolam 2 MG Tablet  30.00 30 Lo Niranjan  [id number]   [facility] (2500)  [place]  4.00 [place]   07/09/2019  1   07/09/2019  Temazepam 30 MG Capsule  30.00 30 Lo Niranjan  [id number]   [facility] (2500)  0  1.50 [place]   06/24/2019  1   06/24/2019  Alprazolam 2 MG Tablet  30.00 30 Lo Niranjan  [id number]   [facility] (2500)  [place]  4.00 [place]   06/13/2019  1   06/13/2019  Temazepam 30 MG Capsule  30.00 30 Lo Niranjan  [id number]   [facility] (2500)  0  1.50 [place]   05/24/2019  1   05/24/2019  Alprazolam 2 MG Tablet  30.00 30 Mo Bebeto  [id number]   [facility] (2500)  [place]  4.00 [place]   05/16/2019  1   05/16/2019  Temazepam 30 MG Capsule  30.00 30 Lo Niranjan  [id number]   [facility] (2500)  [place]  1.50 [place]   04/26/2019  1   04/26/2019  Alprazolam 2 MG Tablet  30.00 30 Mo Bebeto  [id number]   [facility] (2500)  [place]  4.00 [place]   04/04/2019  1   04/03/2019  Temazepam 15 MG Capsule  60.00 30 Lo Niranjan  [id number]   [facility] (2500)  0  1.50 [place]   04/01/2019  2   04/01/2019  Hydrocodone-Acetamin 5-325 MG  12.00 2 Ch Cor  [id number]   Anirudh (5920)  0  30.00 M[place]   03/28/2019  1   03/27/2019  Alprazolam 2 MG Tablet  30.00 30 Lo Niranjan  [id number]   [facility] (2500)  [place]  4.00 [place]   03/04/2019  1   03/04/2019  Temazepam 15 MG Capsule  60.00 30 Lo Niranjan  [id number]   [facility] (2500)  0  1.50 [place]   02/26/2019  1   02/26/2019  Alprazolam 2 MG Tablet  30.00 30 Lo Niranjan  [id number]   [facility] (2500)  0  4.00 [place]   02/01/2019  1   01/24/2019  Temazepam 15 MG Capsule  60.00 30 Lo Niranjan  [id number]   [facility] (2500)  0  1.50 [place]   01/25/2019  1   01/25/2019  Alprazolam 2 MG Tablet  30.00 30 Lo Niranjan  [id number]   [facility] (2500)  0  4.00 [place]   12/26/2018  1   12/19/2018  Alprazolam 2 MG  Tablet  30.00 30 Lo Niranjan  [id number]   [facility] (2500)  [place]  4.00 [place]   12/26/2018  1   12/19/2018  Temazepam 30 MG Capsule  30.00 30 Lo Niranjan  [id number]   [facility] (2500)  0  1.50 L[place]   11/26/2018  1   08/20/2018  Alprazolam 2 MG Tablet  30.00 30 Lo Niranjan  [id number]   [facility] (2500)  3  4.00 L[place]   11/26/2018  1   08/20/2018  Temazepam 30 MG Capsule  30.00 30 Lo Niranjan  [id number]   [facility] (2500)  3  1.50 L[place]   10/25/2018  1   08/20/2018  Alprazolam 2 MG Tablet  30.00 30 Lo Niranjan  [id number]   [facility] (2500)  2  4.00 L[place]   10/25/2018  1   08/20/2018  Temazepam 30 MG Capsule  30.00 30 Lo Niranjan  [id number]   [facility] (2500)  2  1.50 L[place]   10/23/2018  1   10/23/2018  Estrogen-Methyltestos H.S. Tab  90.00 90 Lo Niranjan  [id number]   [facility] (2500[place]  0   [place]   09/24/2018  1   08/20/2018  Alprazolam 2 MG Tablet  30.00 30 Lo Niranjan  [id number]   [facility] (2500)  1  4.00 L[place]   09/24/2018  1   08/20/2018  Temazepam 30 MG Capsule  30.00 30 Lo Niranjan  [id number]   [facility] (2500)  1  1.50 L[place]   08/24/2018  1   08/20/2018  Alprazolam 2 MG Tablet  30.00 30 Lo Niranjan  [id number]   [facility] (2500)  0  4.00 [place]   08/24/2018  1   08/20/2018  Temazepam 30 MG Capsule  30.00 30 Lo Niranjan  [id number]   [facility] (2500)  0  1.50 L[place]   07/26/2018  1   07/26/2018  Alprazolam 2 MG Tablet  30.00 30 Lo Niranjan  [id number]   [facility] (2500)  0  4.00 L[place]   07/26/2018  1   07/26/2018  Temazepam 30 MG Capsule  30.00 30 Lo Niranjan  [id number]   [facility] (2500)  0  1.50 L[place]   07/20/2018  1   07/20/2018  Estrogen-Methyltestos H.S. Tab  90.00 90 Lo Niranjan  [id number]   [facility] (2500[place]  0   [place]   06/27/2018  1   04/30/2018  Alprazolam 2 MG Tablet  60.00 30 Lugo [place]  [id number]   Wal (9667)  2  8.00 L[place]   06/26/2018  1   04/30/2018  Temazepam 30 MG Capsule  30.00 30 Lugo [place]  [id number]   Wal (1287)  2  1.50 L[place]   05/29/2018  1   04/30/2018  Alprazolam 2 MG Tablet  60.00 30 Lugo [place]  [id number]   Wal  (2500)  1  8.00 L[place]   05/29/2018  1   04/30/2018  Temazepam 30 MG Capsule  30.00 30 Lugo [place]  [id number]   [facility] (2500)  1  1.50 L[place]   04/30/2018  1   04/30/2018  Temazepam 30 MG Capsule  30.00 30 Lugo [place]  [id number]   [facility] (2500)  0  1.50 L[place]   04/30/2018  1   04/30/2018  Alprazolam 2 MG Tablet  60.00 30 Lugo [place]  [id number]   [facility] (2500)  0  8.00 L[place]   04/27/2018  1   04/27/2018  Alprazolam 2 MG Tablet  10.00 10 Lugo [place]  [id number]   [facility] (2500)  0  4.00 L[place]   04/05/2018  1   01/29/2018  Temazepam 30 MG Capsule  30.00 30 Lugo [place]  [id number]   [facility] (2500)  2  1.50 L[place]   03/19/2018  1   01/29/2018  Alprazolam 2 MG Tablet  60.00 30 Lugo [place]  [id number]   Wal (2500)  1  8.00 L[place]   03/07/2018  1   01/29/2018  Temazepam 30 MG Capsule  30.00 30 Lugo [place]  [id number]   Wal (2500)  1  1.50 L[place]   02/07/2018  1   01/29/2018  Temazepam 30 MG Capsule  30.00 30 Lugo [place]  [id number]   Wal (2500)  0  1.50 L[place]   02/07/2018  1   01/29/2018  Alprazolam 2 MG Tablet  60.00 30 Lugo [place]  [id number]   [facility] (2500)  0  8.00 L[place]   01/08/2018  1   12/11/2017  Temazepam 30 MG Capsule  30.00 30 Maria T Hor  [id number]   Wal (2500)  1  1.50 L[place]   01/08/2018  1   12/11/2017  Alprazolam 2 MG Tablet  60.00 30 Maria T Hor  [id number]   Wal (2500)  1  8.00 L[place]   12/11/2017  1   12/11/2017  Temazepam 30 MG Capsule  30.00 30 Maria T Hor  [id number]   Wal (2500)  0  1.50 L[place]   12/11/2017  1   12/11/2017  Alprazolam 2 MG Tablet  60.00 30 Maria T Hor  [id number]   [facility] (2500)  0  8.00 L[place]   12/05/2017  2   11/03/2017  Eemt Hs 0.625-1.25 MG Tablet  30.00 30 Maria T Hor  [id number]   [facility] (0159[place]  1   [place]   11/14/2017  2   11/03/2017  Alprazolam 2 MG Tablet  60.00 30 Maria T Hor  [id number]   Wal (3437)  0  8.00 L[place]   11/14/2017  2   11/03/2017  Temazepam 30 MG Capsule  30.00 30 Maria T Hor  [id number]   Wal (8735)  0  1.50 L[place]    11/06/2017  2   11/03/2017  Eemt Hs 0.625-1.25 MG Tablet  30.00 30 Maria T Hor  [id number]   [place] (1184[place]  [place]   [place]   10/17/2017  1   10/17/2017  Temazepam 30 MG Capsule  30.00 30 Maria T Hor  [id number]   Wal (2500)  0  1.50 L[place]   10/17/2017  1   10/17/2017  Alprazolam 2 MG Tablet  60.00 30 Maria T Hor  [id number]   Wal (2500)  0  8.00 L[place]   09/18/2017  1   07/20/2017  Alprazolam 2 MG Tablet  60.00 30 Maria T Hor  [id number]   Wal (2500)  2  8.00 L[place]   09/18/2017  1   07/20/2017  Temazepam 30 MG Capsule  30.00 30 Maria T Hor  [id number]   Wal (2500)  2  1.50 LME  [place]   *Per CDC guidance, the MME conversion factors prescribed or provided as part of the medication-assisted treatment for opioid use disorder should not be used to benchmark against dosage thresholds meant for opioids prescribed for pain. Buprenorphine products have no agreed upon morphine equivalency, and as partial opioid agonists, are not expected to be associated with overdose risk in the same dose-dependent manner as doses for full agonist opioids. MME = morphine milligram equivalents. LME = Lorazepam milligram equivalents. MG = dose in milligrams.   PROVIDERS  Total Providers: 5   Name Address City State Zipcode Phone   Jono Muir [address] [facility] [place] [address]    Ryne RIGGS [facility] [address] [facility] [address] [place] [address]    Molly Sánchez [address] [facility] [place] [address]    Rogerio Pabon,  [address] [facility] [address] [place] [address]    Jeet Oliveira [address] [facility] [place] [address]    PHARMACIES  Total Pharmacies: 3   Name Address City State Zipcode Phone   [facility]  [phone] [place] [address] [phone]   [facility] [phone]) [address] [facility] [address] [id number] [place] [address] [phone]   [id number] [phone]) [address] [place] [address] [phone]

## 2019-09-09 RX ORDER — TEMAZEPAM 30 MG/1
30 CAPSULE ORAL NIGHTLY PRN
Qty: 30 CAP | Refills: 1 | Status: SHIPPED
Start: 2019-09-09 | End: 2019-10-09

## 2019-09-09 NOTE — TELEPHONE ENCOUNTER
[facility]:    [facility]  [place]ERVA HASSAN - [address] [facility]  [address] [facility] [address]  Phone: [phone] Fax: [phone]  .

## 2019-09-09 NOTE — TELEPHONE ENCOUNTER
Requested Prescriptions     Signed Prescriptions Disp Refills   • temazepam (RESTORIL) 30 MG capsule 30 Cap 1     Sig: Take 1 Cap by mouth at bedtime as needed for Sleep for up to 30 days.     Authorizing Provider: MINERVA LAUREANO A.P.R.N.

## 2019-09-17 DIAGNOSIS — F41.9 ANXIETY: ICD-10-CM

## 2019-09-17 RX ORDER — ALPRAZOLAM 2 MG/1
2 TABLET ORAL
Qty: 30 TAB | Refills: 0 | Status: SHIPPED
Start: 2019-09-17 | End: 2019-10-16 | Stop reason: SDUPTHER

## 2019-09-17 NOTE — TELEPHONE ENCOUNTER
[facility] to:    [facility]  [place]ASS[place] - [address] [facility]  [address] [facility] [address]  Phone: [phone] Fax: [phone]  .

## 2019-10-16 ENCOUNTER — HOSPITAL ENCOUNTER (OUTPATIENT)
Dept: RADIOLOGY | Facility: MEDICAL CENTER | Age: 64
End: 2019-10-16
Attending: NURSE PRACTITIONER
Payer: COMMERCIAL

## 2019-10-16 DIAGNOSIS — Z78.0 POST-MENOPAUSAL: ICD-10-CM

## 2019-10-16 DIAGNOSIS — Z12.39 SCREENING FOR BREAST CANCER: ICD-10-CM

## 2019-10-16 DIAGNOSIS — F41.9 ANXIETY: ICD-10-CM

## 2019-10-16 PROCEDURE — 77067 SCR MAMMO BI INCL CAD: CPT

## 2019-10-16 PROCEDURE — 77080 DXA BONE DENSITY AXIAL: CPT

## 2019-10-16 RX ORDER — ALPRAZOLAM 2 MG/1
2 TABLET ORAL
Qty: 30 TAB | Refills: 1 | Status: SHIPPED
Start: 2019-10-17 | End: 2019-12-17 | Stop reason: SDUPTHER

## 2019-10-16 NOTE — TELEPHONE ENCOUNTER
Was the patient seen in the last year in this department? Yes    Does patient have an active prescription for medications requested? Yes DUE 10/17/19    Received Request Via: Patient

## 2019-10-16 NOTE — TELEPHONE ENCOUNTER
Requested Prescriptions     Signed Prescriptions Disp Refills   • alprazolam (XANAX) 2 MG tablet 30 Tab 1     Sig: Take 1 Tab by mouth 1 time daily as needed for Anxiety for up to 30 days.     Authorizing Provider: MINERVA LAUREANO A.P.R.N.

## 2019-10-16 NOTE — TELEPHONE ENCOUNTER
[facility]:    [facility]  [place] HASSAN - [address] [facility]  [address] [facility] [address]  Phone: [phone] Fax: [phone]  .

## 2019-11-05 NOTE — TELEPHONE ENCOUNTER
Was the patient seen in the last year in this department? Yes    Does patient have an active prescription for medications requested? No     Received Request Via: Patient   stretcher

## 2019-11-14 ENCOUNTER — PATIENT MESSAGE (OUTPATIENT)
Dept: MEDICAL GROUP | Facility: PHYSICIAN GROUP | Age: 64
End: 2019-11-14

## 2019-12-17 ENCOUNTER — PATIENT MESSAGE (OUTPATIENT)
Dept: MEDICAL GROUP | Facility: PHYSICIAN GROUP | Age: 64
End: 2019-12-17

## 2019-12-17 DIAGNOSIS — F41.9 ANXIETY: ICD-10-CM

## 2019-12-17 RX ORDER — ALPRAZOLAM 2 MG/1
2 TABLET ORAL
Qty: 30 TAB | Refills: 2 | Status: SHIPPED
Start: 2019-12-17 | End: 2020-03-11 | Stop reason: SDUPTHER

## 2019-12-17 NOTE — PATIENT COMMUNICATION
Was the patient seen in the last year in this department? Yes    Does patient have an active prescription for medications requested? No     Received Request Via: Patient     RX FAXED TO     [facility] S[place] [id number] [facility]  [address] [facility] [address]  Phone: [phone] Fax: [phone]

## 2020-01-06 DIAGNOSIS — F51.04 PSYCHOPHYSIOLOGICAL INSOMNIA: ICD-10-CM

## 2020-01-06 RX ORDER — TEMAZEPAM 30 MG/1
CAPSULE ORAL
Qty: 30 CAP | Refills: 0 | Status: SHIPPED
Start: 2020-01-06 | End: 2020-02-03 | Stop reason: SDUPTHER

## 2020-01-06 NOTE — TELEPHONE ENCOUNTER
[facility] to:    [facility]  MINERVA HASSAN - [address] [facility]  [address] [facility] [address]  Phone: [phone] Fax: [phone]  .

## 2020-01-06 NOTE — TELEPHONE ENCOUNTER
Was the patient seen in the last year in this department? Yes    Does patient have an active prescription for medications requested? No     Received Request Via: Patient       AUBRIE CLARK     Age: 64  demographics  Data as of: 01/06/2020              NARCOTIC 190    Created with Raphaël [id number]   SEDATIVE 451       STIMULANT 000       NARxSCORES can range from 000 to 999. The first two digits represent the composite percentile risk based on an overall analysis of prescription drug use. The third digit represents the number of active prescriptions. The distribution of scores in the population is such that approximately 75% fall below 200, 95% fall below 500 and 99% fall below 650. The information on this report is not warranted as accurate or complete. This report is based on the search criteria supplied and the data entered by the dispensing pharmacy. For more information about any prescription, please contact the dispensing pharmacy or the prescriber. NARxSCORES and Reports are intended to aid, not replace medical decision making. None of the information presented should be used as sole justification for providing or refusing to provide medications.       RX GRAPH Grayed out drugs could not be included in score calculations.   [x] Narcotic [x] Sedative [x] Stimulant [x] Buprenorphine [x] Other    Created with Raphaël 2.3.0All Prescribers  Created with Raphaël 2.3.8Jttlxplw68/164n1z4s1mEdzooqgfbpe3 - Molly Sánchez2 - Jeet Tates3 - Rogerio Meade C4 - Ryne Harta5 - Jono Augustin  Morphine MgEq (MME)  Created with Raphaël 2.3.3529637160  Created with Raphaël 2.3.1Btusgavq54/451r1u0b8i  Lorazepam MgEq (LME)  Created with Raphaël 2.3.0974997  Created with Raphaël 2.3.3Mxlxnwok02/694s3g8r5s  *Per CDC guidance, the MME conversion factors prescribed or provided as part of the medication-assisted treatment for opioid use disorder should not be used to benchmark against  dosage thresholds meant for opioids prescribed for pain. Buprenorphine products have no agreed upon morphine equivalency, and as partial opioid agonists, are not expected to be associated with overdose risk in the same dose-dependent manner as doses for full agonist opioids. MME = morphine milligram equivalents. LME = Lorazepam milligram equivalents. mg = dose in milligrams.     Data Analysis   Narcotics (190) 2 months 6 months 1 year 2 years   Prescribers (narcotic, sedative) 1  19 1  12 3  23 5  26   Pharmacies (narcotic, sedative) 1  25 1  16 2  25 2  19   Morphine mg 0  0 0  0 60  13 60  12   Morphine overlap (1) 0  0 0  0 0  0 0  0           Sedatives (451) 2 months 6 months 1 year 2 years   Prescribers (narcotic, sedative) 1  19 1  12 3  23 5  26   Pharmacies (narcotic, sedative) 1  25 1  16 2  25 2  19   Sedative mg 330  91 990  96 1935  96 4720  98   Sedative overlap (1) 0  0 0  0 46  26 47  21           Stimulants (000)  2 months 6 months 1 year 2 years   Prescribers (stimulant) 0  0 0  0 0  0 0  0   Pharmacies (stimulant) 0  0 0  0 0  0 0  0   Stimulant days 0  0 0  0 0  0 0  0   Stimulant overlap (1) 0  0 0  0 0  0 0  0      (1) Number of days for which a simliar type of medication was prescribed from different prescribers for use on the same day.         Summary   Total Prescriptions: 52   Total Prescribers: 5   Total Pharmacies: 2   Narcotics*    (excluding buprenorphine)  Current Qty: 0   Current MME/day: 0.00   30 Day Avg MME/day: 0.00   Buprenorphine*   Current Qty: 0   Current mg/day: 0.00   30 Day Avg mg/day: 0.00   Sedatives*   Current Qty: 9   Current LME/day: 4.00   30 Day Avg LME/day: 5.37   *Highlighted drugs could not be included in score calculations   PRESCRIPTIONS  Total Prescriptions: 52   Total Private Pay: 0   Fill Date ID Written Drug Qty Days Prescriber Rx # Pharmacy Refill Daily Dose * Pymt Type    12/17/2019  1   12/17/2019   Alprazolam 2 MG Tablet  30.00 30 Lo Niranjan  [id number]   [facility] ([facility]  [place]  4.00 [place]   12/06/2019  1   11/06/2019  Temazepam 30 MG Capsule  30.00 30 Lo Niranjan  [id number]   [facility] (2500)  1  1.50 [place]   11/16/2019  1   10/16/2019  Alprazolam 2 MG Tablet  30.00 30 Lo Niranjan  [id number]   [facility] (2500)  1  4.00 [place]   11/08/2019  1   11/06/2019  Temazepam 30 MG Capsule  30.00 30 Lo Niranjan  [id number]   [facility] (2500)  0  1.50 [place]   10/17/2019  1   10/16/2019  Alprazolam 2 MG Tablet  30.00 30 Lo Niranjan  [id number]   [facility] (2500)  0  4.00 [place]   10/08/2019  1   09/09/2019  Temazepam 30 MG Capsule  30.00 30 Lo Niranjan  [id number]   [facility] (2500)  1  1.50 [place]   09/17/2019  1   09/17/2019  Alprazolam 2 MG Tablet  30.00 30 Lo Niranjan  [id number]   [facility] (2500)  0  4.00 [place]   09/09/2019  1   09/09/2019  Temazepam 30 MG Capsule  30.00 30 Lo Niranjan  [id number]   [facility] (2500)  0  1.50 [place]   08/21/2019  1   08/19/2019  Alprazolam 2 MG Tablet  30.00 30 Lo Niranjan  [id number]   [facility] (2500)  0  4.00 [place]   08/07/2019  1   08/07/2019  Temazepam 30 MG Capsule  30.00 30 Lo Niranjan  [id number]   [facility] (2500)  0  1.50 [place]   07/22/2019  1   07/22/2019  Alprazolam 2 MG Tablet  30.00 30 Lo Niranjan  [id number]   [facility] (2500)  0  4.00 [place]   07/09/2019  1   07/09/2019  Temazepam 30 MG Capsule  30.00 30 Lo Niranjan  [id number]   [facility] (2500)  0  1.50 [place]   06/24/2019  1   06/24/2019  Alprazolam 2 MG Tablet  30.00 30 Lo Niranjan  [id number]   [facility] (2500)  0  4.00 [place]   06/13/2019  1   06/13/2019  Temazepam 30 MG Capsule  30.00 30 Lo Niranjan  [id number]   [facility] (2500)  0  1.50 L[place]   05/24/2019  1   05/24/2019  Alprazolam 2 MG Tablet  30.00 30 Mo [place]  [id number]   [facility] (2500)  0  4.00 [place]   05/16/2019  1   05/16/2019  Temazepam 30 MG Capsule  30.00 30 Lo Niranjan  [id number]   [facility] (2500)  0  1.50 [place]   04/26/2019  1   04/26/2019  Alprazolam 2 MG Tablet  30.00 30 Mo  [place]  [id number]   [facility] (2500)  0  4.00 [place]   04/04/2019  1   04/03/2019  Temazepam 15 MG Capsule  60.00 30 Lo Niranjan  [id number]   [facility] (2500)  0  1.50 [place]   04/01/2019  2   04/01/2019  Hydrocodone-Acetamin 5-325 MG  12.00 2 Ch Cor  [id number]   Anirudh (4820)  0  30.00 M[place]   03/28/2019  1   03/27/2019  Alprazolam 2 MG Tablet  30.00 30 Lo Niranjan  [id number]   [facility] (2500)  0  4.00 [place]   03/04/2019  1   03/04/2019  Temazepam 15 MG Capsule  60.00 30 Lo Niranjan  [id number]   [facility] (2500)  0  1.50 L[place]   02/26/2019  1   02/26/2019  Alprazolam 2 MG Tablet  30.00 30 Lo Niranjan  [id number]   [facility] (2500)  0  4.00 L[place]   02/01/2019  1   01/24/2019  Temazepam 15 MG Capsule  60.00 30 Lo Niranjan  [id number]   [facility] (2500)  0  1.50 L[place]   01/25/2019  1   01/25/2019  Alprazolam 2 MG Tablet  30.00 30 Lo Niranjan  [id number]   [facility] (2500)  0  4.00 [place]   12/26/2018  1   12/19/2018  Alprazolam 2 MG Tablet  30.00 30 Lo Niranjan  [id number]   [facility] (2500)  0  4.00 [place]   12/26/2018  1   12/19/2018  Temazepam 30 MG Capsule  30.00 30 Lo Niranjan  [id number]   [facility] (2500)  0  1.50 [place]   11/26/2018  1   08/20/2018  Alprazolam 2 MG Tablet  30.00 30 Lo Niranjan  [id number]   [facility] (2500)  3  4.00 [place]   11/26/2018  1   08/20/2018  Temazepam 30 MG Capsule  30.00 30 Lo Niranjan  [id number]   [facility] (2500)  3  1.50 L[place]   10/25/2018  1   08/20/2018  Alprazolam 2 MG Tablet  30.00 30 Lo Niranjan  [id number]   [facility] (2500)  2  4.00 [place]   10/25/2018  1   08/20/2018  Temazepam 30 MG Capsule  30.00 30 Lo Niranjan  [id number]   [facility] (2500)  2  1.50 [place]   10/23/2018  1   10/23/2018  Estrogen-Methyltestos H.S. Tab  90.00 90 Lo [facility]  [id number]   [facility] (2500)  0   [place]   09/24/2018  1   08/20/2018  Alprazolam 2 MG Tablet  30.00 30 Lo [facility]  [id number]   Wal (2500)  1  4.00 L[place]   09/24/2018  1   08/20/2018  Temazepam 30 MG Capsule  30.00 30 Lo [facility]  [id number]   [facility] (2500)  1   1.50 L[place]   08/24/2018  1   08/20/2018  Alprazolam 2 MG Tablet  30.00 30 Lo Niranjan  [id number]   [facility] (2500)  0  4.00 [place]   08/24/2018  1   08/20/2018  Temazepam 30 MG Capsule  30.00 30 Lo Niranjan  [id number]   [facility] (2500)  0  1.50 L[place]   07/26/2018  1   07/26/2018  Alprazolam 2 MG Tablet  30.00 30 Lo Niranjan  [id number]   [facility] (2500)  0  4.00 [place]   07/26/2018  1   07/26/2018  Temazepam 30 MG Capsule  30.00 30 Lo Niranjan  [id number]   [facility] (2500)  0  1.50 L[place]   07/20/2018  1   07/20/2018  Estrogen-Methyltestos H.S. Tab  90.00 90 Lo Niranjan  [id number]   [facility] (2500[place]  0   [place]   06/27/2018  1   04/30/2018  Alprazolam 2 MG Tablet  60.00 30 Lugo Michael  [id number]   [facility] (2500)  2  8.00 L[place]   06/26/2018  1   04/30/2018  Temazepam 30 MG Capsule  30.00 30 Lugo Michael  [id number]   [facility] (2500)  2  1.50 L[place]   05/29/2018  1   04/30/2018  Alprazolam 2 MG Tablet  60.00 30 Lugo Michael  [id number]   [facility] (2500)  1  8.00 L[place]   05/29/2018  1   04/30/2018  Temazepam 30 MG Capsule  30.00 30 Lugo Michael  [id number]   [facility] (2500)  1  1.50 [place]   04/30/2018  1   04/30/2018  Temazepam 30 MG Capsule  30.00 30 Lugo Michael  [id number]   [facility] (2500)  0  1.50 L[place]   04/30/2018  1   04/30/2018  Alprazolam 2 MG Tablet  60.00 30 Lugo Michael  [id number]   [facility] (2500)  0  8.00 L[place]   04/27/2018  1   04/27/2018  Alprazolam 2 MG Tablet  10.00 10 Lugo Michael  [id number]   [facility] (2500)  0  4.00 [place]   04/05/2018  1   01/29/2018  Temazepam 30 MG Capsule  30.00 30 Lugo Michael  [id number]   [facility] (2500)  2  1.50 L[place]   03/19/2018  1   01/29/2018  Alprazolam 2 MG Tablet  60.00 30 Lugo Michael  [id number]   [facility] (2500)  1  8.00 [place]   03/07/2018  1   01/29/2018  Temazepam 30 MG Capsule  30.00 30 Lugo Michael  [id number]   [facility] (2500)  1  1.50 L[place]   02/07/2018  1   01/29/2018  Temazepam 30 MG Capsule  30.00 30 Lugo Michael  [id number]   [facility] (2500)  0  1.50 L[place]   02/07/2018  1    01/29/2018  Alprazolam 2 MG Tablet  60.00 30 Lugo Michael  [id number]   [facility] (2500)  0  8.00 [place]   01/08/2018  1   12/11/2017  Temazepam 30 MG Capsule  30.00 30 Maria T Hor  [id number]   [facility] (2500)  1  1.50 L[place]   01/08/2018  1   12/11/2017  Alprazolam 2 MG Tablet  60.00 30 Maria T Hor  [id number]   Wal (2500)  1  8.00 [place]   *Per CDC guidance, the MME conversion factors prescribed or provided as part of the medication-assisted treatment for opioid use disorder should not be used to benchmark against dosage thresholds meant for opioids prescribed for pain. Buprenorphine products have no agreed upon morphine equivalency, and as partial opioid agonists, are not expected to be associated with overdose risk in the same dose-dependent manner as doses for full agonist opioids. MME = morphine milligram equivalents. LME = Lorazepam milligram equivalents. MG = dose in milligrams.   PROVIDERS  Total Providers: 5   Name Address City State Zipcode Phone   [place] [address] [facility] [place] [address]    Rogerio Pabon,  [address] [place] [address]    Jeet Oliveira [address] [facility] [place] [address]    Molly Sánchez [address] [facility] [place] [address]    Ryne Quinteros [address] [facility] [address] [place] [address]    PHARMACIES  Total Pharmacies: 2   Name Address City State Zipcode Phone   [facility] [phone]) [address] [place] [address] [phone]   [facility]  [phone] [place] [phone]

## 2020-01-06 NOTE — TELEPHONE ENCOUNTER
Requested Prescriptions     Signed Prescriptions Disp Refills   • temazepam (RESTORIL) 30 MG capsule 30 Cap 0     Sig: TAKE 1 CAPSULE BY MOUTH AT BEDTIME AS NEEDED FOR SLEEP FOR  UP  TO  30  DAYS     Authorizing Provider: MINERVA LAUREANO A.P.R.N.

## 2020-02-03 DIAGNOSIS — F51.04 PSYCHOPHYSIOLOGICAL INSOMNIA: ICD-10-CM

## 2020-02-04 RX ORDER — TEMAZEPAM 30 MG/1
CAPSULE ORAL
Qty: 30 CAP | Refills: 1 | Status: SHIPPED
Start: 2020-02-04 | End: 2020-04-01 | Stop reason: SDUPTHER

## 2020-02-04 NOTE — TELEPHONE ENCOUNTER
Prescription faxed to:    [facility]  [place] HASS[place] - [address] [facility]  [address] [facility] [facility] [address]  Phone: [phone] Fax: [phone]  .

## 2020-02-04 NOTE — TELEPHONE ENCOUNTER
Requested Prescriptions     Signed Prescriptions Disp Refills   • temazepam (RESTORIL) 30 MG capsule 30 Cap 1     Sig: TAKE 1 CAPSULE BY MOUTH AT BEDTIME AS NEEDED FOR SLEEP FOR  UP  TO  30  DAYS     Authorizing Provider: MINERVA LAUREANO A.P.R.N.

## 2020-03-11 ENCOUNTER — PATIENT MESSAGE (OUTPATIENT)
Dept: MEDICAL GROUP | Facility: PHYSICIAN GROUP | Age: 65
End: 2020-03-11

## 2020-03-11 DIAGNOSIS — F41.9 ANXIETY: ICD-10-CM

## 2020-03-11 RX ORDER — ALPRAZOLAM 2 MG/1
2 TABLET ORAL
Qty: 30 TAB | Refills: 0 | Status: SHIPPED
Start: 2020-03-11 | End: 2020-04-13 | Stop reason: SDUPTHER

## 2020-03-11 NOTE — PATIENT COMMUNICATION
rx faxed to   [facility] - [place] HASS[place] - [address] [facility]  [address] [facility] [address]  Phone: [phone] Fax: [phone]

## 2020-03-11 NOTE — PATIENT COMMUNICATION
Received request via: Patient    Was the patient seen in the last year in this department? Yes    Does the patient have an active prescription (recently filled or refills available) for medication(s) requested? No     AUBRIE CLARK     Age: 64   demographics   Data as of: 03/11/2020         NARCOTIC 190    SEDATIVE 452    STIMULANT 000    NARxSCORES can range from 000 to 999. The first two digits represent the composite percentile risk based on an overall analysis of prescription drug use. The third digit represents the number of active prescriptions. The distribution of scores in the population is such that approximately 75% fall below 200, 95% fall below 500 and 99% fall below 650. The information on this report is not warranted as accurate or complete. This report is based on the search criteria supplied and the data entered by the dispensing pharmacy. For more information about any prescription, please contact the dispensing pharmacy or the prescriber. NARxSCORES and Reports are intended to aid, not replace medical decision making. None of the information presented should be used as sole justification for providing or refusing to provide medications.   [id number]  Rx Graph    Grayed out drugs could not be included in score calculations.  [x]  Narcotic  [x]  Buprenorphine  [x]  Sedative  [x]  Stimulant  [x]  Other    All PrescribersPrescribers4 - Molly Sánchez3 - Jeet Tates2 - Rogerio Meade C1 - Ryne RIGGS QkhjbOisapncu41/529m6z1z0c  Morphine MgEq (MME)  [id number]/654y3v7z8d  Buprenorphine mg  536956Fhnqeeur83/220t4z4g7i  Lorazepam MgEq (LME)  528330Fllkklrg89/638t0r1y4i  *Per CDC guidance, the MME conversion factors prescribed or provided as part of the medication-assisted treatment for opioid use disorder should not be used to benchmark against dosage thresholds meant for opioids prescribed for pain. Buprenorphine products have no agreed upon morphine equivalency,  and as partial opioid agonists, are not expected to be associated with overdose risk in the same dose-dependent manner as doses for full agonist opioids. MME = morphine milligram equivalents. LME = Lorazepam milligram equivalents. MG = dose in milligrams.   Data Analysis                                                                                 Summary  Total Prescriptions:    52    Total Prescribers:    4    Total Pharmacies:    2    Narcotics* (excluding Buprenorphine)  Current Qty:   0   Current MME/day:   0.00   30 Day Avg MME/day:   0.00   Buprenorphine*  Current Qty:   0   Current mg/day:   0.00   30 Day Avg mg/day:   0.00   Sedatives*  Current Qty:   25   Current LME/day:   5.50   30 Day Avg LME/day:   5.63   *Highlighted drugs could not be included in score calculations   Prescriptions  Total Prescriptions: 52    Total Private Pay: 0    Fill Date ID   Written Drug Qty Days Prescriber Rx # Pharmacy Refill   Daily Dose* Pymt Type      03/05/2020  1   02/04/2020  Temazepam 30 MG Capsule  30.00 30 Lo [facility]   [id number]   Wal (2500)   1  1.50 [place]   02/13/2020  1   12/17/2019  Alprazolam 2 MG Tablet  30.00 30 Lo Niranjan   [id number]   [facility] (2500)   2  4.00 L[place]   02/04/2020  1   02/04/2020  Temazepam 30 MG Capsule  30.00 30 Lo Niranjan   [id number]   Wal (2500)   0  1.50 L[place]   01/15/2020  1   12/17/2019  Alprazolam 2 MG Tablet  30.00 30 Lo Niranjan   [id number]   [facility] (2500)   1  4.00 L[place]   01/06/2020  1   01/06/2020  Temazepam 30 MG Capsule  30.00 30 Lo Niranjan   [id number]   Wal (2500)   0  1.50 L[place]   12/17/2019  1   12/17/2019  Alprazolam 2 MG Tablet  30.00 30 Lo Niranjan   [id number]   Wal (2500)   0  4.00 L[place]   12/06/2019  1   11/06/2019  Temazepam 30 MG Capsule  30.00 30 Lo Niranjan   [id number]   Wal (2500)   1  1.50 L[place]   11/16/2019  1   10/16/2019  Alprazolam 2 MG Tablet  30.00 30 Lo Niranjan   [id number]   Wal (2500)   1  4.00 L[place]    11/08/2019  1   11/06/2019  Temazepam 30 MG Capsule  30.00 30 Lo Niranjan   [id number]   Wal (2500)   0  1.50 L[place]   10/17/2019  1   10/16/2019  Alprazolam 2 MG Tablet  30.00 30 Lo Niranjan   [id number]   Wal (2500)   0  4.00 L[place]   10/08/2019  1   09/09/2019  Temazepam 30 MG Capsule  30.00 30 Lo Niranjan   [id number]   Wal (2500)   1  1.50 L[place]   09/17/2019  1   09/17/2019  Alprazolam 2 MG Tablet  30.00 30 Lo Niranjan   [id number]   [facility] (2500)   0  4.00 L[place]   09/09/2019  1   09/09/2019  Temazepam 30 MG Capsule  30.00 30 Lo Niranjan   [id number]   Wal (2500)   0  1.50 L[place]   08/21/2019  1   08/19/2019  Alprazolam 2 MG Tablet  30.00 30 Lo Niranjan   [id number]   Wal (2500)   0  4.00 L[place]   08/07/2019  1   08/07/2019  Temazepam 30 MG Capsule  30.00 30 Lo Niranjan   [id number]   Wal (2500)   0  1.50 L[place]   07/22/2019  1   07/22/2019  Alprazolam 2 MG Tablet  30.00 30 Lo Niranjan   [id number]   [facility] (2500)   0  4.00 L[place]   07/09/2019  1   07/09/2019  Temazepam 30 MG Capsule  30.00 30 Lo Niranjan   [id number]   Wal (2500)   0  1.50 L[place]   06/24/2019  1   06/24/2019  Alprazolam 2 MG Tablet  30.00 30 Lo Niranjan   [id number]   Wal (2500)   0  4.00 L[place]   06/13/2019  1   06/13/2019  Temazepam 30 MG Capsule  30.00 30 Lo Niranjan   [id number]   Wal (2500)   0  1.50 L[place]   05/24/2019  1   05/24/2019  Alprazolam 2 MG Tablet  30.00 30 Mo [place]o   [id number]   Wal (2500)   0  4.00 L[place]   05/16/2019  1   05/16/2019  Temazepam 30 MG Capsule  30.00 30 Lo Niranjan   [id number]   Wal (2500)   0  1.50 L[place]   04/26/2019  1   04/26/2019  Alprazolam 2 MG Tablet  30.00 30 Mo [place]o   [id number]   Wal (6065)   0  4.00 L[place]   04/04/2019  1   04/03/2019  Temazepam 15 MG Capsule  60.00 30 Lo Niranjan   [id number]   Wal (7559)   0  1.50 L[place]   04/01/2019  2   04/01/2019  Hydrocodone-Acetamin 5-325 MG  12.00 2  Cor   [id number]   Anirudh (4865)   0  30.00  MME  [place]   03/28/2019  1   03/27/2019  Alprazolam 2 MG Tablet  30.00 30 Lo Niranjan   [id number]   Wal (2500)   0  4.00 L[place]   03/04/2019  1   03/04/2019  Temazepam 15 MG Capsule  60.00 30 Lo Niranjan   [id number]   Wal (2500)   0  1.50 L[place]   02/26/2019  1   02/26/2019  Alprazolam 2 MG Tablet  30.00 30 Lo Niranjan   [id number]   Wal (2500)   0  4.00 L[place]   02/01/2019  1   01/24/2019  Temazepam 15 MG Capsule  60.00 30 Lo Niranjan   [id number]   Wal (2500)   0  1.50 L[place]   01/25/2019  1   01/25/2019  Alprazolam 2 MG Tablet  30.00 30 Lo Niranjan   [id number]   Wal (2500)   0  4.00 L[place]   12/26/2018  1   12/19/2018  Alprazolam 2 MG Tablet  30.00 30 Lo Niranjan   [id number]   Wal (2500)   0  4.00 L[place]   12/26/2018  1   12/19/2018  Temazepam 30 MG Capsule  30.00 30 Lo Niranjan   [id number]   Wal (2500)   0  1.50 L[place]   11/26/2018  1   08/20/2018  Alprazolam 2 MG Tablet  30.00 30 Lo Niranjan   [id number]   Wal (2500)   3  4.00 L[place]   11/26/2018  1   08/20/2018  Temazepam 30 MG Capsule  30.00 30 Lo Niranjan   [id number]   Wal (2500)   3  1.50 L[place]   10/25/2018  1   08/20/2018  Alprazolam 2 MG Tablet  30.00 30 Lo Niranjan   [id number]   Wal (2500)   2  4.00 L[place]   10/25/2018  1   08/20/2018  Temazepam 30 MG Capsule  30.00 30 Lo Niranjan   [id number]   Wal (2500)   2  1.50 L[place]   10/23/2018  1   10/23/2018  Estrogen-Methyltestos H.S. Tab  90.00 90 Lo Niranjan   [id number]   [facility] (2500)   0   [place]   09/24/2018  1   08/20/2018  Alprazolam 2 MG Tablet  30.00 30 Lo Niranjan   [id number]   Wal (2500)   1  4.00 LME  [place]   09/24/2018  1   08/20/2018  Temazepam 30 MG Capsule  30.00 30 Lo Niranjan   [id number]   Wal (2500)   1  1.50 L[place]   08/24/2018  1   08/20/2018  Alprazolam 2 MG Tablet  30.00 30 Lo Niranjan   [id number]   Wal (2500)   0  4.00 L[place]   08/24/2018  1   08/20/2018  Temazepam 30 MG Capsule  30.00 30 Lo Niranjan   [id number]   Wal (2500)    0  1.50 LME  [place]   07/26/2018  1   07/26/2018  Alprazolam 2 MG Tablet  30.00 30 Lo Niranjan   [id number]   Wal (2500)   0  4.00 L[place]   07/26/2018  1   07/26/2018  Temazepam 30 MG Capsule  30.00 30 Lo Niranjan   [id number]   Wal (2500)   0  1.50 LME  [place]   07/20/2018  1   07/20/2018  Estrogen-Methyltestos H.S. Tab  90.00 90 Lo Niranjan   [id number]   Wal (2500)   0   [place]   06/27/2018  1   04/30/2018  Alprazolam 2 MG Tablet  60.00 30 Lugo Michael   [id number]   Wal (2500)   2  8.00 LME  [place]   06/26/2018  1   04/30/2018  Temazepam 30 MG Capsule  30.00 30 Lugo Michael   [id number]   Wal (2500)   2  1.50 LME  [place]   05/29/2018  1   04/30/2018  Alprazolam 2 MG Tablet  60.00 30 Lugo Michael   [id number]   [facility] (2500)   1  8.00 [place]   05/29/2018  1   04/30/2018  Temazepam 30 MG Capsule  30.00 30 Lugo Michael   [id number]   [facility] (2500)   1  1.50 [place]   04/30/2018  1   04/30/2018  Temazepam 30 MG Capsule  30.00 30 Lugo Michael   [id number]   [facility] (2500)   0  1.50 L[place]   04/30/2018  1   04/30/2018  Alprazolam 2 MG Tablet  60.00 30 Lugo Michael   [id number]   [facility] (2500)   0  8.00 L[place]   04/27/2018  1   04/27/2018  Alprazolam 2 MG Tablet  10.00 10 Lugo Michael   [id number]   [facility] (2500)   0  4.00 L[place]   04/05/2018  1   01/29/2018  Temazepam 30 MG Capsule  30.00 30 Lugo Michael   [id number]   [facility] (2500)   2  1.50 L[place]   03/19/2018  1   01/29/2018  Alprazolam 2 MG Tablet  60.00 30 Lugo Michael   [id number]   [facility] (2500)   1  8.00 LME  [place]   *Per CDC guidance, the MME conversion factors prescribed or provided as part of the medication-assisted treatment for opioid use disorder should not be used to benchmark against dosage thresholds meant for opioids prescribed for pain. Buprenorphine products have no agreed upon morphine equivalency, and as partial opioid agonists, are not expected to be associated with overdose risk in the same dose-dependent manner as doses for full agonist  opioids. MME = morphine milligram equivalents. LME = Lorazepam milligram equivalents. MG = dose in milligrams.   Providers  Total Providers: 4   Name Address City State Zipcode Phone   Ryneleelee Quinteros [address]   [place] [address]    Rogerio Pabon,  [address]   [place] [address]    Jeet Oliveira [address]   [place] [address]    Molly Sánchez [address] [place] [address]    Pharmacies  Total Pharmacies: 2   Name Address City State Zipcode Phone   [phone]   [place] [address] [phone]   [facility]  [phone] [place] [address] [phone]

## 2020-04-01 ENCOUNTER — PATIENT MESSAGE (OUTPATIENT)
Dept: MEDICAL GROUP | Facility: PHYSICIAN GROUP | Age: 65
End: 2020-04-01

## 2020-04-01 DIAGNOSIS — F51.04 PSYCHOPHYSIOLOGICAL INSOMNIA: ICD-10-CM

## 2020-04-01 RX ORDER — TEMAZEPAM 30 MG/1
CAPSULE ORAL
Qty: 30 CAP | Refills: 0 | Status: SHIPPED
Start: 2020-04-03 | End: 2020-04-30 | Stop reason: SDUPTHER

## 2020-04-01 NOTE — PATIENT COMMUNICATION
Received request via: Patient    Was the patient seen in the last year in this department? Yes    Does the patient have an active prescription (recently filled or refills available) for medication(s) requested? No    AUBRIE CLARK     Age: 64   demographics   Data as of: 04/01/2020         NARCOTIC 190    SEDATIVE 452    STIMULANT 000    NARxSCORES can range from 000 to 999. The first two digits represent the composite percentile risk based on an overall analysis of prescription drug use. The third digit represents the number of active prescriptions. The distribution of scores in the population is such that approximately 75% fall below 200, 95% fall below 500 and 99% fall below 650. The information on this report is not warranted as accurate or complete. This report is based on the search criteria supplied and the data entered by the dispensing pharmacy. For more information about any prescription, please contact the dispensing pharmacy or the prescriber. NARxSCORES and Reports are intended to aid, not replace medical decision making. None of the information presented should be used as sole justification for providing or refusing to provide medications.   75%95%99%[id number]  Rx Graph    Grayed out drugs could not be included in score calculations.  [x]  Narcotic  [x]  Buprenorphine  [x]  Sedative  [x]  Stimulant  [x]  Other    All PrescribersPrescribers4 - Molly Sánchez3 - Jeet Tates2 - Rogerio Meade C1 - Ryne RIGGS [id number]  Morphine MgEq (MME)  [id number]  Buprenorphine mg  [id number]/066d2w7n1x  Lorazepam MgEq (LME)  717788Ervupcpv35/873m1t3y6s  *Per CDC guidance, the MME conversion factors prescribed or provided as part of the medication-assisted treatment for opioid use disorder should not be used to benchmark against dosage thresholds meant for opioids prescribed for pain. Buprenorphine products have no agreed upon morphine equivalency, and  as partial opioid agonists, are not expected to be associated with overdose risk in the same dose-dependent manner as doses for full agonist opioids. MME = morphine milligram equivalents. LME = Lorazepam milligram equivalents. MG = dose in milligrams.   Data Analysis                                                                                 Summary  Total Prescriptions:    52    Total Prescribers:    4    Total Pharmacies:    2    Narcotics* (excluding Buprenorphine)  Current Qty:   0   Current MME/day:   0.00   30 Day Avg MME/day:   0.00   Buprenorphine*  Current Qty:   0   Current mg/day:   0.00   30 Day Avg mg/day:   0.00   Sedatives*  Current Qty:   12   Current LME/day:   5.50   30 Day Avg LME/day:   5.63   *Highlighted drugs could not be included in score calculations   Prescriptions  Total Prescriptions: 52    Total Private Pay: 0    Fill Date ID   Written Drug Qty Days Prescriber Rx # Pharmacy Refill   Daily Dose* Pymt Type      03/13/2020  1   03/11/2020  Alprazolam 2 MG Tablet  30.00 30 Lo Niranjan   [id number]   [facility]   [place]  4.[place]   03/05/2020  1   02/04/2020  Temazepam 30 MG Capsule  30.00 30 Lo Niranjan   [id number]   [facility] (2500[facility]   1  1.50 [place]   02/13/2020  1   12/17/2019  Alprazolam 2 MG Tablet  30.00 30 Lo Niranjan   [id number]   [facility] (2500[facility]   2  4.00 [place]   02/04/2020  1   02/04/2020  Temazepam 30 MG Capsule  30.00 30 Lo Niranjan   [id number]   [facility] (2500)   0  1.50 [place]   01/15/2020  1   12/17/2019  Alprazolam 2 MG Tablet  30.00 30 Lo Niranjan   [id number]   [facility] (2500)   1  4.00 [place]   01/06/2020  1   01/06/2020  Temazepam 30 MG Capsule  30.00 30 Lo Niranjan   [id number]   [facility] (2500)   0  1.50 [place]   12/17/2019  1   12/17/2019  Alprazolam 2 MG Tablet  30.00 30 Lo Niranjan   [id number]   [facility] (2500)   0  4.00 [place]   12/06/2019  1   11/06/2019  Temazepam 30 MG Capsule  30.00 30 Lo Niranjan   [id number]   [facility] (2500)   1  1.50 [place]   11/16/2019   1   10/16/2019  Alprazolam 2 MG Tablet  30.00 30 Lo Niranjan   [id number]   [facility] (2500)   1  4.00 [place]   11/08/2019  1   11/06/2019  Temazepam 30 MG Capsule  30.00 30 Lo Niranjan   [id number]   [facility] (2500)   0  1.50 [place]   10/17/2019  1   10/16/2019  Alprazolam 2 MG Tablet  30.00 30 Lo Niranjan   [id number]   [facility] (2500[facility]   0  4.00 [place]   10/08/2019  1   09/09/2019  Temazepam 30 MG Capsule  30.00 30 Lo Niranjan   [id number]   [facility] (2500)   1  1.50 [place]   09/17/2019  1   09/17/2019  Alprazolam 2 MG Tablet  30.00 30 Lo Niranjan   [id number]   [facility] (2500)   0  4.00 [place]   09/09/2019  1   09/09/2019  Temazepam 30 MG Capsule  30.00 30 Lo Niranjan   [id number]   [facility] (2500)   0  1.50 [place]   08/21/2019  1   08/19/2019  Alprazolam 2 MG Tablet  30.00 30 Lo Niranjan   [id number]   [facility] (2500)   0  4.00 [place]   08/07/2019  1   08/07/2019  Temazepam 30 MG Capsule  30.00 30 Lo Niranjan   [id number]   [facility] (2500)   0  1.50 [place]   07/22/2019  1   07/22/2019  Alprazolam 2 MG Tablet  30.00 30 Lo Niranjan   [id number]   [facility] (2500)   [place]  4.00 [place]   07/09/2019  1   07/09/2019  Temazepam 30 MG Capsule  30.00 30 Lo Niranjan   [id number]   [facility] (2500)   0  1.50 [place]   06/24/2019  1   06/24/2019  Alprazolam 2 MG Tablet  30.00 30 Lo Niranjan   [id number]   [facility] (2500)   0  4.00 L[place]   06/13/2019  1   06/13/2019  Temazepam 30 MG Capsule  30.00 30 Lo Niranjan   [id number]   [facility] (2500)   0  1.50 L[place]   05/24/2019  1   05/24/2019  Alprazolam 2 MG Tablet  30.00 30 Mo Bebeto   [id number]   [facility] (2500)   0  4.00 [place]   05/16/2019  1   05/16/2019  Temazepam 30 MG Capsule  30.00 30 Lo Niranjan   [id number]   [facility] (2500)   0  1.50 L[place]   04/26/2019  1   04/26/2019  Alprazolam 2 MG Tablet  30.00 30 Mo Cromwell   [id number]   [facility] (2500)   0  4.00 [place]   04/04/2019  1   04/03/2019  Temazepam 15 MG Capsule  60.00 30 Lo Niranjan   [id number]   [facility] (2500)   0  1.50 [place]    04/01/2019  2   04/01/2019  Hydrocodone-Acetamin 5-325 MG  12.00 2  Cor   [id number]   Anirudh (4820)   0  30.00 M[place]   03/28/2019  1   03/27/2019  Alprazolam 2 MG Tablet  30.00 30 Lo Niranjan   [id number]   [facility] (2500)   0  4.00 [place]   03/04/2019  1   03/04/2019  Temazepam 15 MG Capsule  60.00 30 Lo Niranjan   [id number]   [facility] (2500)   0  1.50 [place]   02/26/2019  1   02/26/2019  Alprazolam 2 MG Tablet  30.00 30 Lo Niranjan   [id number]   [facility] (2500)   0  4.00 [place]   02/01/2019  1   01/24/2019  Temazepam 15 MG Capsule  60.00 30 Lo Niranjan   [id number]   [facility] (2500)   0  1.50 L[place]   01/25/2019  1   01/25/2019  Alprazolam 2 MG Tablet  30.00 30 Lo Niranjan   [id number]   [facility] (2500)   0  4.00 [place]   12/26/2018  1   12/19/2018  Alprazolam 2 MG Tablet  30.00 30 Lo Niranjan   [id number]   [facility] (2500)   0  4.00 [place]   12/26/2018  1   12/19/2018  Temazepam 30 MG Capsule  30.00 30 Lo Niranjan   [id number]   [facility] (2500)   0  1.50 L[place]   11/26/2018  1   08/20/2018  Alprazolam 2 MG Tablet  30.00 30 Lo Niranjan   [id number]   [facility] (2500)   3  4.00 [place]   11/26/2018  1   08/20/2018  Temazepam 30 MG Capsule  30.00 30 Lo Niranjan   [id number]   [facility] (2500)   3  1.50 L[place]   10/25/2018  1   08/20/2018  Alprazolam 2 MG Tablet  30.00 30 Lo Niranjan   [id number]   [facility] (2500)   2  4.00 [place]   10/25/2018  1   08/20/2018  Temazepam 30 MG Capsule  30.00 30 Lo Niranjan   [id number]   [facility] (2500)   2  1.50 [place]   10/23/2018  1   10/23/2018  Estrogen-Methyltestos H.S. Tab  90.00 90 Lo Niranjan   [id number]   [facility] [place]2500[place]   0   [place]   09/24/2018  1   08/20/2018  Alprazolam 2 MG Tablet  30.00 30 Lo Niranjan   [id number]   [facility] (2500)   1  4.00 L[place]   09/24/2018  1   08/20/2018  Temazepam 30 MG Capsule  30.00 30 Lo Niranjan   [id number]   [facility] (2500)   1  1.50 L[place]   08/24/2018  1   08/20/2018  Alprazolam 2 MG Tablet  30.00 30 Lo Niranjan   [id number]   [facility] (2500)   0  4.00 L[place]   08/24/2018  1   08/20/2018  Temazepam 30 MG Capsule  30.00 30 Lo Niranjan   [id number]   [facility] (2500)   0  1.50 [place]   07/26/2018  1   07/26/2018  Alprazolam 2 MG Tablet  30.00 30 Lo Niranjan   [id number]   [facility] (2500)   0  4.00 [place]   07/26/2018  1   07/26/2018  Temazepam 30 MG Capsule  30.00 30 Lo Niranjan   [id number]   [facility] (2500)   0  1.50 [place]   07/20/2018  1   07/20/2018  Estrogen-Methyltestos H.S. Tab  90.00 90 Lo Niranjan   [id number]   [facility] (2500[place]   0   [place]   06/27/2018  1   04/30/2018  Alprazolam 2 MG Tablet  60.00 30 Lugo Michael   [id number]   [facility] (2500)   2  8.00 L[place]   06/26/2018  1   04/30/2018  Temazepam 30 MG Capsule  30.00 30 Lugo Michael   [id number]   [facility] (2500)   2  1.50 [place]   05/29/2018  1   04/30/2018  Alprazolam 2 MG Tablet  60.00 30 Lugo Michael   [id number]   [facility] (2500)   1  8.00 [place]   05/29/2018  1   04/30/2018  Temazepam 30 MG Capsule  30.00 30 Lugo Michael   [id number]   [facility] (2500)   1  1.50 L[place]   04/30/2018  1   04/30/2018  Temazepam 30 MG Capsule  30.00 30 Lugo Michael   [id number]   [facility] (2500)   0  1.50 [place]   04/30/2018  1   04/30/2018  Alprazolam 2 MG Tablet  60.00 30 Lugo Michael   [id number]   [facility] (2500)   0  8.00 [place]   04/27/2018  1   04/27/2018  Alprazolam 2 MG Tablet  10.00 10 Lugo Michael   [id number]   [facility] (2500)   0  4.00 [place]   04/05/2018  1   01/29/2018  Temazepam 30 MG Capsule  30.00 30 Lugo Michael   [id number]   [facility] (2500)   2  1.50 L[place]   *Per CDC guidance, the MME conversion factors prescribed or provided as part of the medication-assisted treatment for opioid use disorder should not be used to benchmark against dosage thresholds meant for opioids prescribed for pain. Buprenorphine products have no agreed upon morphine equivalency, and as partial opioid agonists, are not expected to be associated with overdose risk in the same dose-dependent manner as doses for full agonist opioids. MME =  morphine milligram equivalents. LME = Lorazepam milligram equivalents. MG = dose in milligrams.   Providers  Total Providers: 4   Name Address City State Zipcode Phone   Ryne Quinteros [address]   [place] [address]    Rogerio Pabon,  [address]   [place] [address]    Jeet Oliveira [address]   [place] [address]    Molly Sánchez [address] [facility] [place] [address]    Pharmacies  Total Pharmacies: 2   Name Address City State Zipcode Phone   [facility] [phone]   [place] [address] [phone]   [facility]  [phone]) [address] [place] [address] [phone]

## 2020-04-02 NOTE — PATIENT COMMUNICATION
Rx fax to pharmacy     [facility] [place] - [address]  [address] [address]  Phone: [phone] Fax: [phone]

## 2020-04-13 DIAGNOSIS — F41.9 ANXIETY: ICD-10-CM

## 2020-04-14 RX ORDER — ALPRAZOLAM 2 MG/1
2 TABLET ORAL
Qty: 30 TAB | Refills: 1 | Status: SHIPPED
Start: 2020-04-14 | End: 2020-05-14

## 2020-04-14 NOTE — TELEPHONE ENCOUNTER
Prescription faxed to:    [facility]  [place] - [address]  [address] [address] [facility] [address]  Phone: [phone] Fax: [phone]  .

## 2020-04-14 NOTE — TELEPHONE ENCOUNTER
Received request via: Patient    Was the patient seen in the last year in this department? Yes    Does the patient have an active prescription (recently filled or refills available) for medication(s) requested? No     AUBRIE CLARK     Age: 64   demographics   Data as of: 04/14/2020         NARCOTIC 170    SEDATIVE 431    STIMULANT 000    NARxSCORES can range from 000 to 999. The first two digits represent the composite percentile risk based on an overall analysis of prescription drug use. The third digit represents the number of active prescriptions. The distribution of scores in the population is such that approximately 75% fall below 200, 95% fall below 500 and 99% fall below 650. The information on this report is not warranted as accurate or complete. This report is based on the search criteria supplied and the data entered by the dispensing pharmacy. For more information about any prescription, please contact the dispensing pharmacy or the prescriber. NARxSCORES and Reports are intended to aid, not replace medical decision making. None of the information presented should be used as sole justification for providing or refusing to provide medications.   [id number]%99%[id number]  Rx Graph    Grayed out drugs could not be included in score calculations.  [x]  Narcotic  [x]  Buprenorphine  [x]  Sedative  [x]  Stimulant  [x]  Other    All PrescribersPrescribers4 - Molly Sánchez3 - Jeet Tates2 - Rogerio Meade C1 - Ryne RIGGS PqqlzKejlmsns34/[id number]  Morphine MgEq (MME)  [id number]/795v0u4t2e  Buprenorphine mg  694080Frnczkkr47/055g9g0v3v  Lorazepam MgEq (LME)  988645Ivhtsdow72/117r9s6x8j  *Per CDC guidance, the MME conversion factors prescribed or provided as part of the medication-assisted treatment for opioid use disorder should not be used to benchmark against dosage thresholds meant for opioids prescribed for pain. Buprenorphine products have no agreed upon morphine equivalency,  and as partial opioid agonists, are not expected to be associated with overdose risk in the same dose-dependent manner as doses for full agonist opioids. MME = morphine milligram equivalents. LME = Lorazepam milligram equivalents. MG = dose in milligrams.   Data Analysis                                                                                                             Summary  Total Prescriptions:    52    Total Prescribers:    4    Total Pharmacies:    2    Narcotics* (excluding Buprenorphine)  Current Qty:   0   Current MME/day:   0.00   30 Day Avg MME/day:   0.00   Sedatives*  Current Qty:   18   Current LME/day:   1.50   30 Day Avg LME/day:   5.28   Buprenorphine*  Current Qty:   0   Current mg/day:   0.00   30 Day Avg mg/day:   0.00   *Highlighted drugs could not be included in score calculations   Prescriptions  Total Prescriptions: 52    Total Private Pay: 0    Fill Date ID   Written Drug Qty Days Prescriber Rx # Pharmacy Refill   Daily Dose* Pymt Type      04/03/2020  1   04/01/2020  Temazepam 30 MG Capsule  30.00 30 Lo Niranjan   [id number]   [facility] ([facility]   [place]  1.50 [place]   03/13/2020  1   03/11/2020  Alprazolam 2 MG Tablet  30.00 30 Lo Niranjan   [id number]   [facility] (2500)   0  4.00 [place]   03/05/2020  1   02/04/2020  Temazepam 30 MG Capsule  30.00 30 Lo Niranjan   [id number]   [facility] (2500)   1  1.50 L[place]   02/13/2020  1   12/17/2019  Alprazolam 2 MG Tablet  30.00 30 Lo Niranjan   [id number]   [facility] (2500)   2  4.00 L[place]   02/04/2020  1   02/04/2020  Temazepam 30 MG Capsule  30.00 30 Lo Niranjan   [id number]   [facility] (2500)   0  1.50 L[place]   01/15/2020  1   12/17/2019  Alprazolam 2 MG Tablet  30.00 30 Lo Niranjan   [id number]   [facility] (2500)   1  4.00 L[place]   01/06/2020  1   01/06/2020  Temazepam 30 MG Capsule  30.00 30 Lo Niranjan   [id number]   [facility] (2500)   0  1.50 L[place]   12/17/2019  1   12/17/2019  Alprazolam 2 MG Tablet  30.00 30 Lo Niranjan   [id number]   [facility] (2500)   0  4.00  L[place]   12/06/2019  1   11/06/2019  Temazepam 30 MG Capsule  30.00 30 Lo Niranjan   [id number]   [facility] (2500)   1  1.50 L[place]   11/16/2019  1   10/16/2019  Alprazolam 2 MG Tablet  30.00 30 Lo Niranjan   [id number]   [facility] (2500)   1  4.00 L[place]   11/08/2019  1   11/06/2019  Temazepam 30 MG Capsule  30.00 30 Lo Niranjan   [id number]   [facility] (2500)   0  1.50 L[place]   10/17/2019  1   10/16/2019  Alprazolam 2 MG Tablet  30.00 30 Lo Niranjan   [id number]   [facility] (2500)   0  4.00 L[place]   10/08/2019  1   09/09/2019  Temazepam 30 MG Capsule  30.00 30 Lo Niranjan   [id number]   [facility] (2500)   1  1.50 L[place]   09/17/2019  1   09/17/2019  Alprazolam 2 MG Tablet  30.00 30 Lo Niranjan   [id number]   [facility] (2500)   0  4.00 L[place]   09/09/2019  1   09/09/2019  Temazepam 30 MG Capsule  30.00 30 Lo Niranjan   [id number]   [facility] (2500)   0  1.50 L[place]   08/21/2019  1   08/19/2019  Alprazolam 2 MG Tablet  30.00 30 Lo Niranjan   [id number]   [facility] (2500)   0  4.00 L[place]   08/07/2019  1   08/07/2019  Temazepam 30 MG Capsule  30.00 30 Lo Niranjan   [id number]   [facility] (2500)   0  1.50 L[place]   07/22/2019  1   07/22/2019  Alprazolam 2 MG Tablet  30.00 30 Lo Niranjan   [id number]   [facility] (2500)   0  4.00 L[place]   07/09/2019  1   07/09/2019  Temazepam 30 MG Capsule  30.00 30 Lo Niranjan   [id number]   [facility] (2500)   0  1.50 L[place]   06/24/2019  1   06/24/2019  Alprazolam 2 MG Tablet  30.00 30 Lo Niranjan   [id number]   [facility] (2500)   0  4.00 L[place]   06/13/2019  1   06/13/2019  Temazepam 30 MG Capsule  30.00 30 Lo Niranjan   [id number]   [facility] (2500)   0  1.50 L[place]   05/24/2019  1   05/24/2019  Alprazolam 2 MG Tablet  30.00 30 Mo McDonough   [id number]   Wal (2500)   0  4.00 L[place]   05/16/2019  1   05/16/2019  Temazepam 30 MG Capsule  30.00 30 Lo [facility]   [id number]   Wal (2500)   0  1.50 L[place]   04/26/2019  1   04/26/2019  Alprazolam 2 MG Tablet  30.00 30 Mo McDonough   [id number]   Wal (2500)    0  4.00 L[place]   04/04/2019  1   04/03/2019  Temazepam 15 MG Capsule  60.00 30 Lo Niranjan   [id number]   [facility] (2500)   0  1.50 L[place]   04/01/2019  2   04/01/2019  Hydrocodone-Acetamin 5-325 MG  12.00 2  Cor   [id number]   Anirudh (4820)   0  30.00 M[place]   03/28/2019  1   03/27/2019  Alprazolam 2 MG Tablet  30.00 30 Lo Niranjan   [id number]   [facility] (2500)   0  4.00 L[place]   03/04/2019  1   03/04/2019  Temazepam 15 MG Capsule  60.00 30 Lo Niranjan   [id number]   Wal (2500)   0  1.50 L[place]   02/26/2019  1   02/26/2019  Alprazolam 2 MG Tablet  30.00 30 Lo Niranjan   [id number]   Wal (2500)   0  4.00 L[place]   02/01/2019  1   01/24/2019  Temazepam 15 MG Capsule  60.00 30 Lo Niranjan   [id number]   Wal (2500)   0  1.50 L[place]   01/25/2019  1   01/25/2019  Alprazolam 2 MG Tablet  30.00 30 Lo Niranjan   [id number]   [facility] (2500)   [place]  4.00 L[place]   12/26/2018  1   12/19/2018  Alprazolam 2 MG Tablet  30.00 30 Lo Niranjan   [id number]   [facility] (2500)   [place]  4.00 L[place]   12/26/2018  1   12/19/2018  Temazepam 30 MG Capsule  30.00 30 Lo Niranjan   [id number]   [facility] (2500)   0  1.50 L[place]   11/26/2018  1   08/20/2018  Alprazolam 2 MG Tablet  30.00 30 Lo Niranjan   [id number]   [facility] (2500)   3  4.00 L[place]   11/26/2018  1   08/20/2018  Temazepam 30 MG Capsule  30.00 30 Lo Niranjan   [id number]   Wal (2500)   3  1.50 L[place]   10/25/2018  1   08/20/2018  Alprazolam 2 MG Tablet  30.00 30 Lo Niranjan   [id number]   Wal (2500)   2  4.00 L[place]   10/25/2018  1   08/20/2018  Temazepam 30 MG Capsule  30.00 30 Lo Niranjan   [id number]   Wal (2500)   2  1.50 L[place]   10/23/2018  1   10/23/2018  Estrogen-Methyltestos H.S. Tab  90.00 90 Lo [facility]   [id number]   [facility] (2500)   0   [place]   09/24/2018  1   08/20/2018  Alprazolam 2 MG Tablet  30.00 30 Lo Niranjan   [id number]   [facility] (2500)   1  4.00 LME  [place]   09/24/2018  1   08/20/2018  Temazepam 30 MG Capsule  30.00 30 Lo Niranjan    [id number]   [facility] (2500)   1  1.50 L[place]   08/24/2018  1   08/20/2018  Alprazolam 2 MG Tablet  30.00 30 Lo Niranjan   [id number]   Wal (2500)   0  4.00 L[place]   08/24/2018  1   08/20/2018  Temazepam 30 MG Capsule  30.00 30 Lo Niranjan   [id number]   [facility] (2500)   0  1.50 L[place]   07/26/2018  1   07/26/2018  Alprazolam 2 MG Tablet  30.00 30 Lo Niranjan   [id number]   Wal (2500)   0  4.00 L[place]   07/26/2018  1   07/26/2018  Temazepam 30 MG Capsule  30.00 30 Lo Niranjan   [id number]   Wal (2500)   0  1.50 L[place]   07/20/2018  1   07/20/2018  Estrogen-Methyltestos H.S. Tab  90.00 90 Lo Niranjan   [id number]   [facility] (2500)   0   [place]   06/27/2018  1   04/30/2018  Alprazolam 2 MG Tablet  60.00 30 Lugo Michael   [id number]   Wal (2500)   2  8.00 LME  [place]   06/26/2018  1   04/30/2018  Temazepam 30 MG Capsule  30.00 30 Lugo Michael   [id number]   Wal (2500)   2  1.50 L[place]   05/29/2018  1   04/30/2018  Alprazolam 2 MG Tablet  60.00 30 Lugo Michael   [id number]   Wal (2500)   1  8.00 L[place]   05/29/2018  1   04/30/2018  Temazepam 30 MG Capsule  30.00 30 Lugo Michael   [id number]   [facility] (2500)   1  1.50 LME  [place]   04/30/2018  1   04/30/2018  Temazepam 30 MG Capsule  30.00 30 Lugo Michael   [id number]   Wal (2500)   0  1.50 LME  [place]   04/30/2018  1   04/30/2018  Alprazolam 2 MG Tablet  60.00 30 Lugo Michael   [id number]   [facility] (2500)   0  8.00 LME  [place]   04/27/2018  1   04/27/2018  Alprazolam 2 MG Tablet  10.00 10 Lugo Michael   [id number]   Wal (2500)   0  4.00 LME  [place]   *Per CDC guidance, the MME conversion factors prescribed or provided as part of the medication-assisted treatment for opioid use disorder should not be used to benchmark against dosage thresholds meant for opioids prescribed for pain. Buprenorphine products have no agreed upon morphine equivalency, and as partial opioid agonists, are not expected to be associated with overdose risk in the same dose-dependent manner as doses  for full agonist opioids. MME = morphine milligram equivalents. LME = Lorazepam milligram equivalents. MG = dose in milligrams.   Providers  Total Providers: 4   Name Address City State Zipcode Phone   Ryne RIGGS Neli [address]   [place] [address]    Rogerio Pabon,  [address]   [place] [address]    Jeet Oliveira [address] [facility] [place] [address]    Molly Sánchez [address] [facility] [place] [address]    Pharmacies  Total Pharmacies: 2   Name Address City State Zipcode Phone   [facility] [phone]   [place] [address] [phone]   [facility]  [phone] [place] [id number] [phone]

## 2020-04-30 DIAGNOSIS — F51.04 PSYCHOPHYSIOLOGICAL INSOMNIA: ICD-10-CM

## 2020-04-30 RX ORDER — TEMAZEPAM 30 MG/1
CAPSULE ORAL
Qty: 30 CAP | Refills: 0 | Status: SHIPPED
Start: 2020-05-03 | End: 2020-06-02 | Stop reason: SDUPTHER

## 2020-04-30 NOTE — TELEPHONE ENCOUNTER
[facility]:    [facility]  MINERVA HASSAN - [address]  [address] [address]  Phone: [phone] Fax: [phone]  .

## 2020-04-30 NOTE — TELEPHONE ENCOUNTER
Received request via: Patient    Was the patient seen in the last year in this department? Yes    Does the patient have an active prescription (recently filled or refills available) for medication(s) requested? No     AUBRIE CLARK     Age: 64   demographics   Data as of: 04/30/2020         NARCOTIC 170    SEDATIVE 432    STIMULANT 000    NARxSCORES can range from 000 to 999. The first two digits represent the composite percentile risk based on an overall analysis of prescription drug use. The third digit represents the number of active prescriptions. The distribution of scores in the population is such that approximately 75% fall below 200, 95% fall below 500 and 99% fall below 650. The information on this report is not warranted as accurate or complete. This report is based on the search criteria supplied and the data entered by the dispensing pharmacy. For more information about any prescription, please contact the dispensing pharmacy or the prescriber. NARxSCORES and Reports are intended to aid, not replace medical decision making. None of the information presented should be used as sole justification for providing or refusing to provide medications.   [id number]  Rx Graph    Grayed out drugs could not be included in score calculations.  [x]  Narcotic  [x]  Buprenorphine  [x]  Sedative  [x]  Stimulant  [x]  Other    All PrescribersPrescribers4 - Molly Sánchez3 - Ryne Harta2 - Jeet Montesinos - Rogerio Meade [id number]  Morphine MgEq (MME)  [id number]/[id number]  Buprenorphine mg  667970Crxrpdfk22/814n0g5c9c  Lorazepam MgEq (LME)  840430Cnqjgadm04/948n2m1z9d  *Per CDC guidance, the MME conversion factors prescribed or provided as part of the medication-assisted treatment for opioid use disorder should not be used to benchmark against dosage thresholds meant for opioids prescribed for pain. Buprenorphine products have no agreed upon morphine equivalency,  and as partial opioid agonists, are not expected to be associated with overdose risk in the same dose-dependent manner as doses for full agonist opioids. MME = morphine milligram equivalents. LME = Lorazepam milligram equivalents. MG = dose in milligrams.   Data Analysis                                                                                                             Summary  Total Prescriptions:    52    Total Prescribers:    4    Total Pharmacies:    2    Narcotics* (excluding Buprenorphine)  Current Qty:   0   Current MME/day:   0.00   30 Day Avg MME/day:   0.00   Sedatives*  Current Qty:   15   Current LME/day:   5.50   30 Day Avg LME/day:   5.28   Buprenorphine*  Current Qty:   0   Current mg/day:   0.00   30 Day Avg mg/day:   0.00   *Highlighted drugs could not be included in score calculations   Prescriptions  Total Prescriptions: 52    Total Private Pay: 0    Fill Date ID   Written Drug Qty Days Prescriber Rx # Pharmacy Refill   Daily Dose* Pymt Type      04/14/2020  1   04/14/2020  Alprazolam 2 MG Tablet  30.00 30 Lo Niranjan   [id number]   [facility]   [place]  4.00 [place]   04/03/2020  1   04/01/2020  Temazepam 30 MG Capsule  30.00 30 Lo Niranjan   [id number]   [facility] (2500[facility]   0  1.50 [place]   03/13/2020  1   03/11/2020  Alprazolam 2 MG Tablet  30.00 30 Lo Niranjan   [id number]   [facility] (2500)   0  4.00 [place]   03/05/2020  1   02/04/2020  Temazepam 30 MG Capsule  30.00 30 Lo Niranjan   [id number]   [facility] (2500)   1  1.50 [place]   02/13/2020  1   12/17/2019  Alprazolam 2 MG Tablet  30.00 30 Lo Niranjan   [id number]   [facility] (2500)   2  4.00 [place]   02/04/2020  1   02/04/2020  Temazepam 30 MG Capsule  30.00 30 Lo Niranjan   [id number]   [facility] (2500)   0  1.50 [place]   01/15/2020  1   12/17/2019  Alprazolam 2 MG Tablet  30.00 30 Lo Niranjan   [id number]   [facility] (2500)   1  4.00 [place]   01/06/2020  1   01/06/2020  Temazepam 30 MG Capsule  30.00 30 Lo Niranjan   [id number]   [facility] (2500)   0  1.50  [place]   12/17/2019  1   12/17/2019  Alprazolam 2 MG Tablet  30.00 30 Lo Niranjan   [id number]   [facility] (2500)   0  4.00 [place]   12/06/2019  1   11/06/2019  Temazepam 30 MG Capsule  30.00 30 Lo Niranjan   [id number]   [facility] (2500)   1  1.50 [place]   11/16/2019  1   10/16/2019  Alprazolam 2 MG Tablet  30.00 30 Lo Niranjan   [id number]   [facility] (2500)   1  4.00 [place]   11/08/2019  1   11/06/2019  Temazepam 30 MG Capsule  30.00 30 Lo Niranjan   [id number]   [facility] (2500)   0  1.50 [place]   10/17/2019  1   10/16/2019  Alprazolam 2 MG Tablet  30.00 30 Lo Niranjan   [id number]   [facility] (2500)   0  4.00 [place]   10/08/2019  1   09/09/2019  Temazepam 30 MG Capsule  30.00 30 Lo Niranjan   [id number]   [facility] (2500)   1  1.50 [place]   09/17/2019  1   09/17/2019  Alprazolam 2 MG Tablet  30.00 30 Lo Niranjan   [id number]   [facility] (2500)   0  4.00 [place]   09/09/2019  1   09/09/2019  Temazepam 30 MG Capsule  30.00 30 Lo Niranjan   [id number]   [facility] (2500)   0  1.50 [place]   08/21/2019  1   08/19/2019  Alprazolam 2 MG Tablet  30.00 30 Lo Niranjan   [id number]   [facility] (2500)   0  4.00 [place]   08/07/2019  1   08/07/2019  Temazepam 30 MG Capsule  30.00 30 Lo Niranjan   [id number]   [facility] (2500)   0  1.50 [place]   07/22/2019  1   07/22/2019  Alprazolam 2 MG Tablet  30.00 30 Lo Niranjan   [id number]   [facility] (2500)   0  4.00 [place]   07/09/2019  1   07/09/2019  Temazepam 30 MG Capsule  30.00 30 Lo Niranjan   [id number]   [facility] (2500)   0  1.50 [place]   06/24/2019  1   06/24/2019  Alprazolam 2 MG Tablet  30.00 30 Lo Niranjan   [id number]   [facility] (2500)   0  4.00 [place]   06/13/2019  1   06/13/2019  Temazepam 30 MG Capsule  30.00 30 Lo Niranjan   [id number]   [facility] (2500)   0  1.50 L[place]   05/24/2019  1   05/24/2019  Alprazolam 2 MG Tablet  30.00 30 Mo [place]   [id number]   [facility] (2500)   0  4.00 [place]   05/16/2019  1   05/16/2019  Temazepam 30 MG Capsule  30.00 30 Lo Niranjan   [id number]   [facility] (2500)    0  1.50 L[place]   04/26/2019  1   04/26/2019  Alprazolam 2 MG Tablet  30.00 30 Mo Bebeto   [id number]   [facility] (2500)   0  4.00 [place]   04/04/2019  1   04/03/2019  Temazepam 15 MG Capsule  60.00 30 Lo Niranjan   [id number]   [facility] (2500)   0  1.50 [place]   04/01/2019  2   04/01/2019  Hydrocodone-Acetamin 5-325 MG  12.00 2  Cor   [id number]   Anirudh (4820)   0  30.00 M[place]   03/28/2019  1   03/27/2019  Alprazolam 2 MG Tablet  30.00 30 Lo Niranjan   [id number]   [facility] (2500)   0  4.00 [place]   03/04/2019  1   03/04/2019  Temazepam 15 MG Capsule  60.00 30 Lo Niranjan   [id number]   [facility] (2500)   0  1.50 L[place]   02/26/2019  1   02/26/2019  Alprazolam 2 MG Tablet  30.00 30 Lo Niranjan   [id number]   [facility] (2500)   0  4.00 [place]   02/01/2019  1   01/24/2019  Temazepam 15 MG Capsule  60.00 30 Lo Niranjan   [id number]   [facility] (2500)   0  1.50 L[place]   01/25/2019  1   01/25/2019  Alprazolam 2 MG Tablet  30.00 30 Lo Niranjan   [id number]   [facility] (2500)   0  4.00 [place]   12/26/2018  1   12/19/2018  Alprazolam 2 MG Tablet  30.00 30 Lo Niranjan   [id number]   [facility] (2500)   0  4.00 [place]   12/26/2018  1   12/19/2018  Temazepam 30 MG Capsule  30.00 30 Lo Niranjan   [id number]   [facility] (2500)   0  1.50 L[place]   11/26/2018  1   08/20/2018  Alprazolam 2 MG Tablet  30.00 30 Lo Niranjan   [id number]   [facility] (2500)   3  4.00 [place]   11/26/2018  1   08/20/2018  Temazepam 30 MG Capsule  30.00 30 Lo Niranjan   [id number]   [facility] (2500)   3  1.50 [place]   10/25/2018  1   08/20/2018  Alprazolam 2 MG Tablet  30.00 30 Lo Niranjan   [id number]   [facility] (2500)   2  4.00 [place]   10/25/2018  1   08/20/2018  Temazepam 30 MG Capsule  30.00 30 Lo [facility]   [id number]   [facility] (2500)   2  1.50 L[place]   10/23/2018  1   10/23/2018  Estrogen-Methyltestos H.S. Tab  90.00 90 Lo [facility]   [id number]   [facility] (2500)   0   [place]   09/24/2018  1   08/20/2018  Alprazolam 2 MG Tablet  30.00 30 Lo Niranjan    [id number]   [facility] (2500)   1  4.00 [place]   09/24/2018  1   08/20/2018  Temazepam 30 MG Capsule  30.00 30 Lo Niranjan   [id number]   [facility] (2500)   1  1.50 [place]   08/24/2018  1   08/20/2018  Temazepam 30 MG Capsule  30.00 30 Lo Niranjan   [id number]   [facility] (2500)   0  1.50 L[place]   08/24/2018  1   08/20/2018  Alprazolam 2 MG Tablet  30.00 30 Lo Niranjan   [id number]   [facility] (2500)   0  4.00 [place]   07/26/2018  1   07/26/2018  Alprazolam 2 MG Tablet  30.00 30 Lo Niranjan   [id number]   [facility] (2500)   0  4.00 [place]   07/26/2018  1   07/26/2018  Temazepam 30 MG Capsule  30.00 30 Lo Niranjan   [id number]   [facility] (2500)   0  1.50 [place]   07/20/2018  1   07/20/2018  Estrogen-Methyltestos H.S. Tab  90.00 90 Lo Niranjan   [id number]   [facility] (2500[place]   0   [place]   06/27/2018  1   04/30/2018  Alprazolam 2 MG Tablet  60.00 30 Lugo [place]   [id number]   [facility] (2500)   2  8.00 [place]   06/26/2018  1   04/30/2018  Temazepam 30 MG Capsule  30.00 30 [facility] [place]   [id number]   [facility] (2500)   2  1.50 [place]   05/29/2018  1   04/30/2018  Alprazolam 2 MG Tablet  60.00 30 Lugo [place]   [id number]   [facility] (2500)   1  8.00 [place]   05/29/2018  1   04/30/2018  Temazepam 30 MG Capsule  30.00 30 [facility] [place]   [id number]   [facility] (2500)   1  1.50 [place]   04/30/2018  1   04/30/2018  Temazepam 30 MG Capsule  30.00 30 [facility]o [place]   [id number]   [facility] (2500)   0  1.50 [place]   04/30/2018  1   04/30/2018  Alprazolam 2 MG Tablet  60.00 30 Lugo [place]   [id number]   [facility] (2500)   0  8.00 [place]   *Per CDC guidance, the MME conversion factors prescribed or provided as part of the medication-assisted treatment for opioid use disorder should not be used to benchmark against dosage thresholds meant for opioids prescribed for pain. Buprenorphine products have no agreed upon morphine equivalency, and as partial opioid agonists, are not expected to be associated with overdose risk in the same dose-dependent manner as doses  for full agonist opioids. MME = morphine milligram equivalents. LME = Lorazepam milligram equivalents. MG = dose in milligrams.   Providers  Total Providers: 4   Name Address City State Zipcode Phone   Rogerio Pabon Jr [address]   [place] [address]    Jeet Oliveira [address] [facility] [place] [address]    Ryne Quinteros [address]   [place] [address]    Molly Sánchez [address] [facility] [place] [address]    Pharmacies  Total Pharmacies: 2   Name Address City State Zipcode Phone   [facility] [phone]   [place] [address] [phone]   [facility]  [phone]) [phone] [place] [address] [phone]

## 2020-06-02 DIAGNOSIS — F51.04 PSYCHOPHYSIOLOGICAL INSOMNIA: ICD-10-CM

## 2020-06-02 RX ORDER — TEMAZEPAM 30 MG/1
CAPSULE ORAL
Qty: 30 CAP | Refills: 1 | Status: SHIPPED | OUTPATIENT
Start: 2020-06-02 | End: 2020-07-31 | Stop reason: SDUPTHER

## 2020-06-12 DIAGNOSIS — F41.9 ANXIETY: ICD-10-CM

## 2020-06-12 NOTE — TELEPHONE ENCOUNTER
Received request via: Pharmacy    Was the patient seen in the last year in this department? Yes LOV 08/2019 LAB 08/19/2019    Does the patient have an active prescription (recently filled or refills available) for medication(s) requested? No        AUBRIE CLARK     Age: 64   demographics   Data as of: 06/12/2020         NARCOTIC 170    SEDATIVE 421    STIMULANT 000    NARxSCORES can range from 000 to 999. The first two digits represent the composite percentile risk based on an overall analysis of prescription drug use. The third digit represents the number of active prescriptions. The distribution of scores in the population is such that approximately 75% fall below 200, 95% fall below 500 and 99% fall below 650. The information on this report is not warranted as accurate or complete. This report is based on the search criteria supplied and the data entered by the dispensing pharmacy. For more information about any prescription, please contact the dispensing pharmacy or the prescriber. NARxSCORES and Reports are intended to aid, not replace medical decision making. None of the information presented should be used as sole justification for providing or refusing to provide medications.   [id number]  Rx Graph    Grayed out drugs could not be included in score calculations.  [x]  Narcotic  [x]  Buprenorphine  [x]  Sedative  [x]  Stimulant  [x]  Other    All PrescribersPrescribers4 - Molly Sánchez3 - Jeet Tates2 - Rogerio Meade C1 - Ryne RIGGS UlwizQwlklwap05/[id number]  Morphine MgEq (MME)  [id number]/316m1q4i1b  Buprenorphine mg  594104Uimcrirj09/262i7l5k9s  Lorazepam MgEq (LME)  306749Tltjhexw80/383k3b9z4y  *Per CDC guidance, the MME conversion factors prescribed or provided as part of the medication-assisted treatment for opioid use disorder should not be used to benchmark against dosage thresholds meant for opioids prescribed for pain. Buprenorphine products have no  agreed upon morphine equivalency, and as partial opioid agonists, are not expected to be associated with overdose risk in the same dose-dependent manner as doses for full agonist opioids. MME = morphine milligram equivalents. LME = Lorazepam milligram equivalents. MG = dose in milligrams.   Data Analysis                                                                                 Summary  Total Prescriptions:    51    Total Prescribers:    4    Total Pharmacies:    2    Narcotics* (excluding Buprenorphine)  Current Qty:   0   Current MME/day:   0.00   30 Day Avg MME/day:   0.00   Sedatives*  Current Qty:   20   Current LME/day:   5.50   30 Day Avg LME/day:   5.58   Buprenorphine*  Current Qty:   0   Current mg/day:   0.00   30 Day Avg mg/day:   0.00   *Highlighted drugs could not be included in score calculations   Prescriptions  Total Prescriptions: 51    Total Private Pay: 0    Fill Date ID   Written Drug Qty Days Prescriber Rx # Pharmacy Refill   Daily Dose* Pymt Type      06/03/2020  1   06/02/2020  Temazepam 30 MG Capsule  30.00 30 Lo [facility]   [id number]   [facility] (2500)   0  1.50 L[place]   05/13/2020  1   04/14/2020  Alprazolam 2 MG Tablet  30.00 30 Lo Niranjan   [id number]   [facility] (2500)   1  4.00 L[place]   05/03/2020  1   04/30/2020  Temazepam 30 MG Capsule  30.00 30 Lo Niranjan   [id number]   Wal (2500)   0  1.50 L[place]   04/14/2020  1   04/14/2020  Alprazolam 2 MG Tablet  30.00 30 Lo Niranjan   [id number]   Wal (2500)   0  4.00 L[place]   04/03/2020  1   04/01/2020  Temazepam 30 MG Capsule  30.00 30 Lo Niranjan   [id number]   [facility] (2500)   0  1.50 L[place]   03/13/2020  1   03/11/2020  Alprazolam 2 MG Tablet  30.00 30 Lo Niranjan   [id number]   Wal (2500)   0  4.00 L[place]   03/05/2020  1   02/04/2020  Temazepam 30 MG Capsule  30.00 30 Lo Niranjan   [id number]   Wal (2500)   1  1.50 L[place]   02/13/2020  1   12/17/2019  Alprazolam 2 MG Tablet  30.00 30 Lo Niranjan   [id number]   Wal (2500)   2   4.00 L[place]   02/04/2020  1   02/04/2020  Temazepam 30 MG Capsule  30.00 30 Lo Niranjan   [id number]   Wal (2500)   0  1.50 L[place]   01/15/2020  1   12/17/2019  Alprazolam 2 MG Tablet  30.00 30 Lo Niranjan   [id number]   Wal (2500)   1  4.00 L[place]   01/06/2020  1   01/06/2020  Temazepam 30 MG Capsule  30.00 30 Lo Niranjan   [id number]   Wal (2500)   0  1.50 L[place]   12/17/2019  1   12/17/2019  Alprazolam 2 MG Tablet  30.00 30 Lo Niranjan   [id number]   Wal (2500)   0  4.00 L[place]   12/06/2019  1   11/06/2019  Temazepam 30 MG Capsule  30.00 30 Lo Niranjan   [id number]   Wal (2500)   1  1.50 L[place]   11/16/2019  1   10/16/2019  Alprazolam 2 MG Tablet  30.00 30 Lo Niranjan   [id number]   Wal (2500)   1  4.00 L[place]   11/08/2019  1   11/06/2019  Temazepam 30 MG Capsule  30.00 30 Lo Niranjan   [id number]   Wal (2500)   0  1.50 L[place]   10/17/2019  1   10/16/2019  Alprazolam 2 MG Tablet  30.00 30 Lo Niranjan   [id number]   Wal (2500)   0  4.00 L[place]   10/08/2019  1   09/09/2019  Temazepam 30 MG Capsule  30.00 30 Lo Niranjan   [id number]   Wal (2500)   1  1.50 L[place]   09/17/2019  1   09/17/2019  Alprazolam 2 MG Tablet  30.00 30 Lo Niranjan   [id number]   Wal (2500)   0  4.00 L[place]   09/09/2019  1   09/09/2019  Temazepam 30 MG Capsule  30.00 30 Lo Niranjan   [id number]   Wal (2500)   0  1.50 L[place]   08/21/2019  1   08/19/2019  Alprazolam 2 MG Tablet  30.00 30 Lo Niranjan   [id number]   [facility] (2500)   0  4.00 L[place]   08/07/2019  1   08/07/2019  Temazepam 30 MG Capsule  30.00 30 Lo Niranjan   [id number]   Wal (2500)   0  1.50 L[place]   07/22/2019  1   07/22/2019  Alprazolam 2 MG Tablet  30.00 30 Lo Niranjan   [id number]   Wal (2500)   0  4.00 L[place]   07/09/2019  1   07/09/2019  Temazepam 30 MG Capsule  30.00 30 Lo Niranjan   [id number]   Wal (2500)   0  1.50 L[place]   06/24/2019  1   06/24/2019  Alprazolam 2 MG Tablet  30.00 30 Lo Niranjan   [id number]   Wal  (2500)   0  4.00 L[place]   06/13/2019  1   06/13/2019  Temazepam 30 MG Capsule  30.00 30 Lo Niranjan   [id number]   Wal (2500)   0  1.50 L[place]   05/24/2019  1   05/24/2019  Alprazolam 2 MG Tablet  30.00 30 Mo Bebeto   [id number]   Wal (2500)   0  4.00 L[place]   05/16/2019  1   05/16/2019  Temazepam 30 MG Capsule  30.00 30 Lo Niranjan   [id number]   Wal (2500)   0  1.50 L[place]   04/26/2019  1   04/26/2019  Alprazolam 2 MG Tablet  30.00 30 Mo Bebeto   [id number]   Wal (2500)   0  4.00 L[place]   04/04/2019  1   04/03/2019  Temazepam 15 MG Capsule  60.00 30 Lo Niranjan   [id number]   Wal (2500)   0  1.50 L[place]   04/01/2019  2   04/01/2019  Hydrocodone-Acetamin 5-325 MG  12.00 2 [facility]   [id number]   Anirudh (4820)   0  30.00 MME  [place]   03/28/2019  1   03/27/2019  Alprazolam 2 MG Tablet  30.00 30 Lo Niranjan   [id number]   Wal (2500)   0  4.00 L[place]   03/04/2019  1   03/04/2019  Temazepam 15 MG Capsule  60.00 30 Lo Niranjan   [id number]   Wal (2500)   0  1.50 L[place]   02/26/2019  1   02/26/2019  Alprazolam 2 MG Tablet  30.00 30 Lo Niranjan   [id number]   Wal (2500)   0  4.00 L[place]   02/01/2019  1   01/24/2019  Temazepam 15 MG Capsule  60.00 30 Lo Niranjan   [id number]   Wal (2500)   0  1.50 L[place]   01/25/2019  1   01/25/2019  Alprazolam 2 MG Tablet  30.00 30 Lo Niranjan   [id number]   Wal (2500)   0  4.00 L[place]   12/26/2018  1   12/19/2018  Alprazolam 2 MG Tablet  30.00 30 Lo Niranjan   [id number]   Wal (2500)   0  4.00 L[place]   12/26/2018  1   12/19/2018  Temazepam 30 MG Capsule  30.00 30 Lo Niranjan   [id number]   Wal (2500)   0  1.50 L[place]   11/26/2018  1   08/20/2018  Alprazolam 2 MG Tablet  30.00 30 Lo Niranjan   [id number]   Wal (2500)   3  4.00 L[place]   11/26/2018  1   08/20/2018  Temazepam 30 MG Capsule  30.00 30 Lo Niranjan   [id number]   Wal (2500)   3  1.50 L[place]   10/25/2018  1   08/20/2018  Alprazolam 2 MG Tablet  30.00 30 Lo Niranjan    [id number]   Wal (2500)   2  4.00 L[place]   10/25/2018  1   08/20/2018  Temazepam 30 MG Capsule  30.00 30 Lo Niranjan   [id number]   Wal (2500)   2  1.50 L[place]   10/23/2018  1   10/23/2018  Estrogen-Methyltestos H.S. Tab  90.00 90 Lo Niranjan   [id number]   [facility] (2500)   0   [place]   09/24/2018  1   08/20/2018  Alprazolam 2 MG Tablet  30.00 30 Lo Niranjan   [id number]   Wal (2500)   1  4.00 L[place]   09/24/2018  1   08/20/2018  Temazepam 30 MG Capsule  30.00 30 Lo Niranjan   [id number]   Wal (2500)   1  1.50 L[place]   08/24/2018  1   08/20/2018  Alprazolam 2 MG Tablet  30.00 30 Lo Niranjan   [id number]   Wal (2500)   0  4.00 L[place]   08/24/2018  1   08/20/2018  Temazepam 30 MG Capsule  30.00 30 Lo Niranjan   [id number]   Wal (2500)   0  1.50 LME  [place]   07/26/2018  1   07/26/2018  Alprazolam 2 MG Tablet  30.00 30 Lo Niranjan   [id number]   Wal (2500)   0  4.00 L[place]   07/26/2018  1   07/26/2018  Temazepam 30 MG Capsule  30.00 30 Lo Niranjan   [id number]   Wal (2500)   0  1.50 L[place]   07/20/2018  1   07/20/2018  Estrogen-Methyltestos H.S. Tab  90.00 90 Lo Niranjan   [id number]   Wal (2500)   0   [place]   06/27/2018  1   04/30/2018  Alprazolam 2 MG Tablet  60.00 30 Lugo [place]   [id number]   Wal (2500)   2  8.00 LME  [place]   06/26/2018  1   04/30/2018  Temazepam 30 MG Capsule  30.00 30 Lugo [place]   [id number]   Wal (2500)   2  1.50 LME  [place]   *Per CDC guidance, the MME conversion factors prescribed or provided as part of the medication-assisted treatment for opioid use disorder should not be used to benchmark against dosage thresholds meant for opioids prescribed for pain. Buprenorphine products have no agreed upon morphine equivalency, and as partial opioid agonists, are not expected to be associated with overdose risk in the same dose-dependent manner as doses for full agonist opioids. MME = morphine milligram equivalents. LME = Lorazepam milligram equivalents. MG = dose in  milligrams.   Providers  Total Providers: 4   Name Address City State Zipcode Phone   Ryne RIGGS Neli [address]   [place] [address]    Rogerio Pabon, Jr [address]   [place] [address]    Jeet Oliveira [address]   [place] [address]    Molly Sánchez [address]   [place] [address]    Pharmacies  Total Pharmacies: 2   Name Address City State Zipcode Phone   [facility] [phone]   [place] [id number] [phone]   [facility]  [phone] [place] [address] [phone]   This Graph is Interactive  Patient Demographics  Score History  Drug Details  Prescriber Details

## 2020-06-13 RX ORDER — ALPRAZOLAM 2 MG/1
TABLET ORAL
Qty: 30 TAB | Refills: 0 | Status: SHIPPED | OUTPATIENT
Start: 2020-06-13 | End: 2020-07-15

## 2020-06-14 NOTE — TELEPHONE ENCOUNTER
Requested Prescriptions     Signed Prescriptions Disp Refills   • alprazolam (XANAX) 2 MG tablet 30 Tab 0     Sig: TAKE 1 TABLET BY MOUTH ONCE DAILY AS NEEDED FOR ANXIETY FOR  UP  TO  30  DAYS     Authorizing Provider: MINERVA LAUREANO A.P.R.N.

## 2020-07-14 DIAGNOSIS — F41.9 ANXIETY: ICD-10-CM

## 2020-07-15 RX ORDER — ALPRAZOLAM 2 MG/1
TABLET ORAL
Qty: 30 TAB | Refills: 0 | Status: SHIPPED | OUTPATIENT
Start: 2020-07-15 | End: 2020-08-13 | Stop reason: SDUPTHER

## 2020-07-15 NOTE — TELEPHONE ENCOUNTER
Received request via: Pharmacy    Was the patient seen in the last year in this department? Yes    Does the patient have an active prescription (recently filled or refills available) for medication(s) requested? No       AUBRIE CLARK      Age: 64   demographics  Data as of: 07/15/2020                       NARCOTIC  170      SEDATIVE  411      STIMULANT  000      NARxSCORES can range from 000 to 999. The first two digits represent the composite percentile risk based on an overall analysis of prescription drug use. The third digit represents the number of active prescriptions. The distribution of scores in the population is such that approximately 75% fall below 200, 95% fall below 500 and 99% fall below 650. The information on this report is not warranted as accurate or complete. This report is based on the search criteria supplied and the data entered by the dispensing pharmacy. For more information about any prescription, please contact the dispensing pharmacy or the prescriber. NARxSCORES and Reports are intended to aid, not replace medical decision making. None of the information presented should be used as sole justification for providing or refusing to provide medications.       [id number]        Rx Graph    Grayed out drugs could not be included in score calculations.      Narcotic    Buprenorphine    Sedative    Stimulant    Other        All PrescribersPrescribers3 - Molly Sánchez2 - Jeet Tates1 - Rogerio Meade [id number]      Morphine MgEq (MME)    [id number]/520s9a2u3t      Buprenorphine mg    [id number]/[id number]      Lorazepam MgEq (LME)    083935Dynxtpbi41/[id number]      *Per CDC guidance, the MME conversion factors prescribed or provided as part of the medication-assisted treatment for opioid use disorder should not be used to benchmark against dosage thresholds meant for opioids prescribed for pain. Buprenorphine products have no  agreed upon morphine equivalency, and as partial opioid agonists, are not expected to be associated with overdose risk in the same dose-dependent manner as doses for full agonist opioids. MME = morphine milligram equivalents. LME = Lorazepam milligram equivalents. MG = dose in milligrams.         Data Analysis                                                                                                                                                                                   Summary      Total Prescriptions:   51      Total Prescribers:   3      Total Pharmacies:   2        Narcotics* (excluding Buprenorphine)      Current Qty:   0     Current MME/day:   0.00     30 Day Avg MME/day:   0.00       Sedatives*      Current Qty:   16     Current LME/day:   1.50     30 Day Avg LME/day:   5.42       Buprenorphine*      Current Qty:   0     Current mg/day:   0.00     30 Day Avg mg/day:   0.00         *Highlighted drugs could not be included in score calculations     Prescriptions       Total Prescriptions: 51       Total Private Pay: 0        Fill Date     ID      Written    Drug    Qty    Days    Prescriber    Rx #    Pharmacy     Refill      Daily Dose*    Pymt Type             07/02/2020  1   06/02/2020      Temazepam 30 MG Capsule        30.00 30  [facility]   [id number]    [facility] (2500[facility]   1  1.50 [place]   06/14/2020  1   06/13/2020      Alprazolam 2 MG Tablet        30.00 30  Lo Niranjan   [id number]    Wal (2500)   0  4.00 [place]   06/03/2020  1   06/02/2020      Temazepam 30 MG Capsule        30.00 30  Lo Niranjan   [id number]    Wal (2500)   0  1.50 [place]   05/13/2020  1   04/14/2020      Alprazolam 2 MG Tablet        30.00 30  Lo Niranjan   [id number]    Wal (2500)   1  4.00 [place]   05/03/2020  1   04/30/2020      Temazepam 30 MG Capsule        30.00 30  Lo Niranjan   [id number]    Wal (2500)   0  1.50 [place]   04/14/2020  1   04/14/2020      Alprazolam 2 MG Tablet        30.00 30   Lo Niranjan   [id number]    [facility] (2500)   [place]  4.00 [place]   04/03/2020  1   04/01/2020      Temazepam 30 MG Capsule        30.00 30  Lo Niranjan   [id number]    [facility] (2500)   [place]  1.50 [place]   03/13/2020  1   03/11/2020      Alprazolam 2 MG Tablet        30.00 30  Lo Niranjan   [id number]    [facility] (2500)   [place]  4.00 [place]   03/05/2020  1   02/04/2020      Temazepam 30 MG Capsule        30.00 30  Lo Niranjan   [id number]    [facility] (2500)   1  1.50 [place]   02/13/2020  1   12/17/2019      Alprazolam 2 MG Tablet        30.00 30  Lo Niranjan   [id number]    [facility] (2500[place]   2  4.00 [place]   02/04/2020  1   02/04/2020      Temazepam 30 MG Capsule        30.00 30  Lo Niranjan   [id number]    [facility] (2500)   [place]  1.50 [place]   01/15/2020  1   12/17/2019      Alprazolam 2 MG Tablet        30.00 30  Lo Niranjan   [id number]    [facility] (2500)   1  4.00 [place]   01/06/2020  1   01/06/2020      Temazepam 30 MG Capsule        30.00 30  Lo Niranjan   [id number]    Wal (2500)   [place]  1.50 [place]   12/17/2019  1   12/17/2019      Alprazolam 2 MG Tablet        30.00 30  Lo Niranjan   [id number]    Wal (2500)   [place]  4.00 [place]   12/06/2019  1   11/06/2019      Temazepam 30 MG Capsule        30.00 30  Lo Niranjan   [id number]    [facility] (2500)   1  1.50 [place]   11/16/2019  1   10/16/2019      Alprazolam 2 MG Tablet        30.00 30  Lo Niranjan   [id number]    [facility] (2500)   1  4.00 [place]   11/08/2019  1   11/06/2019      Temazepam 30 MG Capsule        30.00 30  Lo Niranjan   [id number]    [facility] (2500)   [place]  1.50 [place]   10/17/2019  1   10/16/2019      Alprazolam 2 MG Tablet        30.00 30  Lo Niranjan   [id number]    [facility] (2500)   [place]  4.00 [place]   10/08/2019  1   09/09/2019      Temazepam 30 MG Capsule        30.00 30  Lo Niranjan   [id number]    [facility] (2500)   1  1.50 [place]   09/17/2019  1   09/17/2019      Alprazolam 2 MG Tablet        30.00 30  [facility]   [id number]    Wal (2500)   [place]  4.00 [place]    09/09/2019  1   09/09/2019      Temazepam 30 MG Capsule        30.00 30  Lo Niranjan   [id number]    Wal (2500)   [place]  1.50 [place]   08/21/2019  1   08/19/2019      Alprazolam 2 MG Tablet        30.00 30  Lo Niranjan   [id number]    Wal (2500)   0  4.00 [place]   08/07/2019  1   08/07/2019      Temazepam 30 MG Capsule        30.00 30  Lo Niranjan   [id number]    [facility] (2500)   0  1.50 [place]   07/22/2019  1   07/22/2019      Alprazolam 2 MG Tablet        30.00 30  Lo Niranjan   [id number]    [facility] (2500)   [place]  4.00 [place]   07/09/2019  1   07/09/2019      Temazepam 30 MG Capsule        30.00 30  Lo Niranjan   [id number]    Wal (2500)   [place]  1.50 [place]   06/24/2019  1   06/24/2019      Alprazolam 2 MG Tablet        30.00 30  Lo Niranjan   [id number]    [facility] (2500)   0  4.00 [place]   06/13/2019  1   06/13/2019      Temazepam 30 MG Capsule        30.00 30  Lo Niranjan   [id number]    [facility] (2500)   0  1.50 [place]   05/24/2019  1   05/24/2019      Alprazolam 2 MG Tablet        30.00 30  Mo Bebeto   [id number]    Wal (2500)   [place]  4.00 [place]   05/16/2019  1   05/16/2019      Temazepam 30 MG Capsule        30.00 30  Lo Niranjan   [id number]    Wal (2500)   0  1.50 [place]   04/26/2019  1   04/26/2019      Alprazolam 2 MG Tablet        30.00 30  Mo Warren   [id number]    Wal (2500)   0  4.00 [place]   04/04/2019  1   04/03/2019      Temazepam 15 MG Capsule        60.00 30  Lo Niranjan   [id number]    Wal (2500)   0  1.50 [place]   04/01/2019  2   04/01/2019      Hydrocodone-Acetamin 5-325 MG        12.00 2   Cor   [id number]    Anirudh (3320)   0  30.00 M[place]   03/28/2019  1   03/27/2019      Alprazolam 2 MG Tablet        30.00 30  Lo Niranjan   [id number]    Wal (2500)   0  4.00 [place]   03/04/2019  1   03/04/2019      Temazepam 15 MG Capsule        60.00 30  Lo [facility]   [id number]    Wal (2500)   0  1.50 [place]   02/26/2019  1   02/26/2019      Alprazolam 2 MG Tablet         30.00 30  Lo Niranjan   [id number]    Wal (2500)   [place]  4.00 [place]   02/01/2019  1   01/24/2019      Temazepam 15 MG Capsule        60.00 30  Lo Niranjan   [id number]    Wal (2500)   0  1.50 [place]   01/25/2019  1   01/25/2019      Alprazolam 2 MG Tablet        30.00 30  Lo Niranjan   [id number]    Wal (2500)   0  4.00 [place]   12/26/2018  1   12/19/2018      Alprazolam 2 MG Tablet        30.00 30  Lo Niranjan   [id number]    Wal (2500)   0  4.00 [place]   12/26/2018  1   12/19/2018      Temazepam 30 MG Capsule        30.00 30  Lo Niranjan   [id number]    Wal (2500)   0  1.50 [place]   11/26/2018  1   08/20/2018      Alprazolam 2 MG Tablet        30.00 30  Lo Niranjan   [id number]    Wal (2500)   3  4.00 [place]   11/26/2018  1   08/20/2018      Temazepam 30 MG Capsule        30.00 30  Lo Niranjan   [id number]    Wal (2500)   3  1.50 [place]   10/25/2018  1   08/20/2018      Alprazolam 2 MG Tablet        30.00 30  Lo Niranjan   [id number]    [facility] (2500)   2  4.00 [place]   10/25/2018  1   08/20/2018      Temazepam 30 MG Capsule        30.00 30  Lo Niranjan   [id number]    Wal (2500)   2  1.50 [place]   10/23/2018  1   10/23/2018      Estrogen-Methyltestos H.S. Tab        90.00 90  Lo Niranjan   [id number]    [facility] (2500[place]   0   [place]   09/24/2018  1   08/20/2018      Alprazolam 2 MG Tablet        30.00 30  Lo Niranjan   [id number]    [facility] (2500)   1  4.00 [place]   09/24/2018  1   08/20/2018      Temazepam 30 MG Capsule        30.00 30  Lo Niranjan   [id number]    [facility] (2500)   1  1.50 L[place]   08/24/2018  1   08/20/2018      Alprazolam 2 MG Tablet        30.00 30  Lo Niranjan   [id number]    [facility] (2500)   0  4.00 [place]   08/24/2018  1   08/20/2018      Temazepam 30 MG Capsule        30.00 30  Lo Niranjan   [id number]    Wal (2500)   0  1.50 [place]   07/26/2018  1   07/26/2018      Alprazolam 2 MG Tablet        30.00 30  Lo Niranjan   [id number]    Wal (2500)   0  4.00 L[place]   07/26/2018  1   07/26/2018      Temazepam 30 MG Capsule        30.00 30  Lo Niranjan   [id number]    Wal (2500)   0  1.50 [place]   07/20/2018  1   07/20/2018      Estrogen-Methyltestos H.S. Tab        90.00 90  Lo Niranjan   [id number]    [facility] (2500[place]   0   [place]     *Per CDC guidance, the MME conversion factors prescribed or provided as part of the medication-assisted treatment for opioid use disorder should not be used to benchmark against dosage thresholds meant for opioids prescribed for pain. Buprenorphine products have no agreed upon morphine equivalency, and as partial opioid agonists, are not expected to be associated with overdose risk in the same dose-dependent manner as doses for full agonist opioids. MME = morphine milligram equivalents. LME = Lorazepam milligram equivalents. MG = dose in milligrams.         Providers    Total Providers: 3       Name    Address    City    State    Zipcode    Phone      Rogerio Pabon Jr    [address] [place] [address]    Jeet Oliveira    [address] [place] [address]    Molly Sánchez    [address] [place] [address]          Pharmacies    Total Pharmacies: 2       Name    Address    City    State    Zipcode    Phone      [facility] [phone]) [phone] [place] [address] [phone]   [facility]  [phone]) [address] [place] [id number] [phone]

## 2020-07-31 DIAGNOSIS — F51.04 PSYCHOPHYSIOLOGICAL INSOMNIA: ICD-10-CM

## 2020-07-31 RX ORDER — TEMAZEPAM 30 MG/1
30 CAPSULE ORAL NIGHTLY PRN
Qty: 30 CAP | Refills: 0 | Status: SHIPPED | OUTPATIENT
Start: 2020-07-31 | End: 2020-08-31 | Stop reason: SDUPTHER

## 2020-07-31 NOTE — PROGRESS NOTES
Received request via: Patient    Was the patient seen in the last year in this department? Yes LOV  08/19/2019 next makayla. 08/24/2020    Does the patient have an active prescription (recently filled or refills available) for medication(s) requested? No     AUBRIE CLARK     Age: 64   demographics   Data as of: 07/31/2020         NARCOTIC 170    SEDATIVE 422    STIMULANT 000    NARxSCORES can range from 000 to 999. The first two digits represent the composite percentile risk based on an overall analysis of prescription drug use. The third digit represents the number of active prescriptions. The distribution of scores in the population is such that approximately 75% fall below 200, 95% fall below 500 and 99% fall below 650. The information on this report is not warranted as accurate or complete. This report is based on the search criteria supplied and the data entered by the dispensing pharmacy. For more information about any prescription, please contact the dispensing pharmacy or the prescriber. NARxSCORES and Reports are intended to aid, not replace medical decision making. None of the information presented should be used as sole justification for providing or refusing to provide medications.   [id number]  Rx Graph    Grayed out drugs could not be included in score calculations.  [x]  Narcotic  [x]  Buprenorphine  [x]  Sedative  [x]  Stimulant  [x]  Other    All PrescribersPrescribers3 - Molly Sánchez2 - Jeet Tates1 - Rogerio Meade [id number]  Morphine MgEq (MME)  [id number]/583e1u3v8d  Buprenorphine mg  157634Dwmvyeui03/295m4m4a1g  Lorazepam MgEq (LME)  178090Deisxrqu41/461m7s2o6p  *Per CDC guidance, the MME conversion factors prescribed or provided as part of the medication-assisted treatment for opioid use disorder should not be used to benchmark against dosage thresholds meant for opioids prescribed for pain. Buprenorphine products have no agreed upon  morphine equivalency, and as partial opioid agonists, are not expected to be associated with overdose risk in the same dose-dependent manner as doses for full agonist opioids. MME = morphine milligram equivalents. LME = Lorazepam milligram equivalents. MG = dose in milligrams.   Data Analysis                                                                                 Summary  Total Prescriptions:    49    Total Prescribers:    3    Total Pharmacies:    2    Narcotics* (excluding Buprenorphine)  Current Qty:   0   Current MME/day:   0.00   30 Day Avg MME/day:   0.00   Sedatives*  Current Qty:   13   Current LME/day:   5.50   30 Day Avg LME/day:   5.42   Buprenorphine*  Current Qty:   0   Current mg/day:   0.00   30 Day Avg mg/day:   0.00   *Highlighted drugs could not be included in score calculations   Prescriptions  Total Prescriptions: 49    Total Private Pay: 0    Fill Date ID   Written Drug Qty Days Prescriber Rx # Pharmacy Refill   Daily Dose* Pymt Type      07/15/2020  1   07/15/2020  Alprazolam 2 MG Tablet  30.00 30 Lo Niranjan   [id number]   [facility] (2500[place]   [place]  4.00 [place]   07/02/2020  1   06/02/2020  Temazepam 30 MG Capsule  30.00 30 Lo Niranjan   [id number]   [facility] (2500)   1  1.50 [place]   06/14/2020  1   06/13/2020  Alprazolam 2 MG Tablet  30.00 30 Lo Niranjan   [id number]   [facility] (2500)   0  4.00 [place]   06/03/2020  1   06/02/2020  Temazepam 30 MG Capsule  30.00 30 Lo Niranjan   [id number]   [facility] (2500)   0  1.50 [place]   05/13/2020  1   04/14/2020  Alprazolam 2 MG Tablet  30.00 30 Lo Niranjan   [id number]   [facility] (2500)   1  4.00 L[place]   05/03/2020  1   04/30/2020  Temazepam 30 MG Capsule  30.00 30 Lo Niranjan   [id number]   Wal (2500)   0  1.50 [place]   04/14/2020  1   04/14/2020  Alprazolam 2 MG Tablet  30.00 30 Lo Niranjan   [id number]   [facility] (2500)   0  4.00 [place]   04/03/2020  1   04/01/2020  Temazepam 30 MG Capsule  30.00 30 Lo Niranjan   [id number]   Wal (2500)   0  1.50 L[place]   03/13/2020  1   03/11/2020  Alprazolam 2 MG Tablet  30.00 30 Lo Niranjan   [id number]   Wal (2500)   0  4.00 L[place]   03/05/2020  1   02/04/2020  Temazepam 30 MG Capsule  30.00 30 Lo Niranjan   [id number]   Wal (2500)   1  1.50 L[place]   02/13/2020  1   12/17/2019  Alprazolam 2 MG Tablet  30.00 30 Lo Niranjan   [id number]   Wal (2500)   2  4.00 L[place]   02/04/2020  1   02/04/2020  Temazepam 30 MG Capsule  30.00 30 Lo Niranjan   [id number]   Wal (2500)   0  1.50 L[place]   01/15/2020  1   12/17/2019  Alprazolam 2 MG Tablet  30.00 30 Lo Niranjan   [id number]   Wal (2500)   1  4.00 [place]   01/06/2020  1   01/06/2020  Temazepam 30 MG Capsule  30.00 30 Lo Niranjan   [id number]   Wal (2500)   0  1.50 L[place]   12/17/2019  1   12/17/2019  Alprazolam 2 MG Tablet  30.00 30 Lo Niranjan   [id number]   Wal (2500)   0  4.00 L[place]   12/06/2019  1   11/06/2019  Temazepam 30 MG Capsule  30.00 30 Lo Niranjan   [id number]   Wal (2500)   1  1.50 L[place]   11/16/2019  1   10/16/2019  Alprazolam 2 MG Tablet  30.00 30 Lo Niranjan   [id number]   Wal (2500)   1  4.00 L[place]   11/08/2019  1   11/06/2019  Temazepam 30 MG Capsule  30.00 30 Lo Niranjan   [id number]   Wal (2500)   0  1.50 L[place]   10/17/2019  1   10/16/2019  Alprazolam 2 MG Tablet  30.00 30 Lo Niranjan   [id number]   Wal (2500)   0  4.00 L[place]   10/08/2019  1   09/09/2019  Temazepam 30 MG Capsule  30.00 30 Lo Niranjan   [id number]   Wal (2500)   1  1.50 L[place]   09/17/2019  1   09/17/2019  Alprazolam 2 MG Tablet  30.00 30 Lo Niranjan   [id number]   Wal (2500)   0  4.00 [place]   09/09/2019  1   09/09/2019  Temazepam 30 MG Capsule  30.00 30 Lo [facility]   [id number]   Wal (2500)   0  1.50 L[place]   08/21/2019  1   08/19/2019  Alprazolam 2 MG Tablet  30.00 30 Lo [facility]   [id number]   Wal (2500)   0  4.00 L[place]   08/07/2019  1   08/07/2019  Temazepam 30 MG Capsule  30.00 30 Lo [facility]   [id number]   Wal (2500)   0   1.50 L[place]   07/22/2019  1   07/22/2019  Alprazolam 2 MG Tablet  30.00 30 Lo Niranjan   [id number]   Wal (2500)   0  4.00 L[place]   07/09/2019  1   07/09/2019  Temazepam 30 MG Capsule  30.00 30 Lo Niranjan   [id number]   Wal (2500)   0  1.50 L[place]   06/24/2019  1   06/24/2019  Alprazolam 2 MG Tablet  30.00 30 Lo Niranjan   [id number]   Wal (2500)   0  4.00 L[place]   06/13/2019  1   06/13/2019  Temazepam 30 MG Capsule  30.00 30 Lo Niranjan   [id number]   Wal (2500)   0  1.50 L[place]   05/24/2019  1   05/24/2019  Alprazolam 2 MG Tablet  30.00 30 Mo Bebeto   [id number]   Wal (2500)   0  4.00 L[place]   05/16/2019  1   05/16/2019  Temazepam 30 MG Capsule  30.00 30 Lo Niranjan   [id number]   Wal (2500)   0  1.50 L[place]   04/26/2019  1   04/26/2019  Alprazolam 2 MG Tablet  30.00 30 Mo Jackson   [id number]   Wal (2500)   0  4.00 L[place]   04/04/2019  1   04/03/2019  Temazepam 15 MG Capsule  60.00 30 Lo Niranjan   [id number]   Wal (2500)   0  1.50 L[place]   04/01/2019  2   04/01/2019  Hydrocodone-Acetamin 5-325 MG  12.00 2  Cor   [id number]   Anirudh (4820)   0  30.00 M[place]   03/28/2019  1   03/27/2019  Alprazolam 2 MG Tablet  30.00 30 Lo Niranjan   [id number]   Wal (2500)   [place]  4.00 [place]   03/04/2019  1   03/04/2019  Temazepam 15 MG Capsule  60.00 30 Lo Niranjan   [id number]   Wal (2500)   0  1.50 L[place]   02/26/2019  1   02/26/2019  Alprazolam 2 MG Tablet  30.00 30 Lo Niranjan   [id number]   Wal (2500)   [place]  4.00 [place]   02/01/2019  1   01/24/2019  Temazepam 15 MG Capsule  60.00 30 Lo Niranjan   [id number]   Wal (2500)   0  1.50 L[place]   01/25/2019  1   01/25/2019  Alprazolam 2 MG Tablet  30.00 30 Lo Niranjan   [id number]   Wal (2500)   0  4.00 L[place]   12/26/2018  1   12/19/2018  Alprazolam 2 MG Tablet  30.00 30 Lo Niranjan   [id number]   Wal (2500)   [place]  4.00 [place]   12/26/2018  1   12/19/2018  Temazepam 30 MG Capsule  30.00 30 Lo [facility]   [id number]    Wal (2500)   0  1.50 L[place]   11/26/2018  1   08/20/2018  Alprazolam 2 MG Tablet  30.00 30 Lo Niranjan   [id number]   Wal (2500)   3  4.00 L[place]   11/26/2018  1   08/20/2018  Temazepam 30 MG Capsule  30.00 30 Lo Niranjan   [id number]   Wal (2500)   3  1.50 L[place]   10/25/2018  1   08/20/2018  Alprazolam 2 MG Tablet  30.00 30 Lo Niranjan   [id number]   Wal (2500)   2  4.00 L[place]   10/25/2018  1   08/20/2018  Temazepam 30 MG Capsule  30.00 30 Lo Niranjan   [id number]   Wal (2500)   2  1.50 L[place]   10/23/2018  1   10/23/2018  Estrogen-Methyltestos H.S. Tab  90.00 90 Lo Niranjan   [id number]   [facility] (2500[place]   0   [place]   09/24/2018  1   08/20/2018  Alprazolam 2 MG Tablet  30.00 30 Lo Niranjan   [id number]   Wal (2500)   1  4.00 L[place]   09/24/2018  1   08/20/2018  Temazepam 30 MG Capsule  30.00 30 Lo Niranjan   [id number]   Wal (2500)   1  1.50 L[place]   08/24/2018  1   08/20/2018  Alprazolam 2 MG Tablet  30.00 30 Lo Niranjan   [id number]   Wal (2500)   0  4.00 L[place]   08/24/2018  1   08/20/2018  Temazepam 30 MG Capsule  30.00 30 Lo Niranjan   [id number]   Wal (2500)   0  1.50 LME  [place]   *Per CDC guidance, the MME conversion factors prescribed or provided as part of the medication-assisted treatment for opioid use disorder should not be used to benchmark against dosage thresholds meant for opioids prescribed for pain. Buprenorphine products have no agreed upon morphine equivalency, and as partial opioid agonists, are not expected to be associated with overdose risk in the same dose-dependent manner as doses for full agonist opioids. MME = morphine milligram equivalents. LME = Lorazepam milligram equivalents. MG = dose in milligrams.   Providers  Total Providers: 3   Name Address [facility] State Zipcode Phone   Rogerio Pabon,  [address] [place] [address]    Jeet Oliveira [address] [facility] [address]    Molly Sánchez [address] [address]     Pharmacies  Total Pharmacies: 2   Name Address [facility] State Zipcode Phone   [facility] [phone] [place] [address] [phone]   [facility]  (7622) [address] [place] [address] [phone]   This Graph is Interactive  Patient Demographics  Score History  Drug Details  Prescriber Details

## 2020-08-13 ENCOUNTER — PATIENT MESSAGE (OUTPATIENT)
Dept: MEDICAL GROUP | Facility: PHYSICIAN GROUP | Age: 65
End: 2020-08-13

## 2020-08-13 DIAGNOSIS — F41.9 ANXIETY: ICD-10-CM

## 2020-08-13 RX ORDER — ALPRAZOLAM 2 MG/1
TABLET ORAL
Qty: 30 TAB | Refills: 0 | Status: SHIPPED | OUTPATIENT
Start: 2020-08-13 | End: 2020-09-14 | Stop reason: SDUPTHER

## 2020-08-13 NOTE — PROGRESS NOTES
Requested Prescriptions     Signed Prescriptions Disp Refills   • alprazolam (XANAX) 2 MG tablet 30 Tab 0     Sig: TAKE 1 TABLET BY MOUTH ONCE DAILY AS NEEDED FOR ANXIETY FOR UP TO 30 DAYS     Authorizing Provider: MINERVA LAUREANO     pdmp without discrepancy  GIFTY Chou.

## 2020-08-13 NOTE — PATIENT COMMUNICATION
Received request via: Patient    Was the patient seen in the last year in this department? Yes LAST SEEN 8/19/2019    Does the patient have an active prescription (recently filled or refills available) for medication(s) requested? DUE 8/14/20

## 2020-08-24 ENCOUNTER — OFFICE VISIT (OUTPATIENT)
Dept: MEDICAL GROUP | Facility: PHYSICIAN GROUP | Age: 65
End: 2020-08-24
Payer: COMMERCIAL

## 2020-08-24 VITALS
HEART RATE: 100 BPM | RESPIRATION RATE: 16 BRPM | SYSTOLIC BLOOD PRESSURE: 136 MMHG | HEIGHT: 64 IN | WEIGHT: 139 LBS | OXYGEN SATURATION: 94 % | DIASTOLIC BLOOD PRESSURE: 86 MMHG | TEMPERATURE: 97.3 F | BODY MASS INDEX: 23.73 KG/M2

## 2020-08-24 DIAGNOSIS — Z00.00 ANNUAL PHYSICAL EXAM: ICD-10-CM

## 2020-08-24 DIAGNOSIS — F41.9 ANXIETY: ICD-10-CM

## 2020-08-24 DIAGNOSIS — F33.1 MODERATE EPISODE OF RECURRENT MAJOR DEPRESSIVE DISORDER (HCC): ICD-10-CM

## 2020-08-24 DIAGNOSIS — Z12.11 ENCOUNTER FOR SCREENING FECAL OCCULT BLOOD TESTING: ICD-10-CM

## 2020-08-24 DIAGNOSIS — F51.04 PSYCHOPHYSIOLOGICAL INSOMNIA: ICD-10-CM

## 2020-08-24 DIAGNOSIS — R53.82 CHRONIC FATIGUE: ICD-10-CM

## 2020-08-24 PROCEDURE — 99397 PER PM REEVAL EST PAT 65+ YR: CPT | Performed by: NURSE PRACTITIONER

## 2020-08-24 ASSESSMENT — PATIENT HEALTH QUESTIONNAIRE - PHQ9
8. MOVING OR SPEAKING SO SLOWLY THAT OTHER PEOPLE COULD HAVE NOTICED. OR THE OPPOSITE, BEING SO FIGETY OR RESTLESS THAT YOU HAVE BEEN MOVING AROUND A LOT MORE THAN USUAL: SEVERAL DAYS
4. FEELING TIRED OR HAVING LITTLE ENERGY: SEVERAL DAYS
1. LITTLE INTEREST OR PLEASURE IN DOING THINGS: MORE THAN HALF THE DAYS
2. FEELING DOWN, DEPRESSED, IRRITABLE, OR HOPELESS: SEVERAL DAYS
SUM OF ALL RESPONSES TO PHQ9 QUESTIONS 1 AND 2: 3
5. POOR APPETITE OR OVEREATING: SEVERAL DAYS
3. TROUBLE FALLING OR STAYING ASLEEP OR SLEEPING TOO MUCH: SEVERAL DAYS
7. TROUBLE CONCENTRATING ON THINGS, SUCH AS READING THE NEWSPAPER OR WATCHING TELEVISION: SEVERAL DAYS
9. THOUGHTS THAT YOU WOULD BE BETTER OFF DEAD, OR OF HURTING YOURSELF: NOT AT ALL
6. FEELING BAD ABOUT YOURSELF - OR THAT YOU ARE A FAILURE OR HAVE LET YOURSELF OR YOUR FAMILY DOWN: SEVERAL DAYS
SUM OF ALL RESPONSES TO PHQ QUESTIONS 1-9: 9

## 2020-08-25 ENCOUNTER — HOSPITAL ENCOUNTER (OUTPATIENT)
Facility: MEDICAL CENTER | Age: 65
End: 2020-08-25
Attending: NURSE PRACTITIONER
Payer: COMMERCIAL

## 2020-08-25 PROCEDURE — 82274 ASSAY TEST FOR BLOOD FECAL: CPT

## 2020-08-25 NOTE — PROGRESS NOTES
"Chief Complaint   Patient presents with   • Annual Exam       Subjective:   Lupe Thompson is a 64 y.o. female here today for annual exam    Annual physical exam  Here for annual exam. Due for fit test.  Up to date on mammo.  Frustrated with less exercise.      Anxiety  Taking xanax 12/-1 daily along with temazepam nightly.     Moderate episode of recurrent major depressive disorder (HCC)  Not currently on any medication.  No depression reported.  PHQ9 negative.     Psychophysiological insomnia  Reports good sleep 7-8 hours nightly.     =    Current medicines (including changes today)  Current Outpatient Medications   Medication Sig Dispense Refill   • alprazolam (XANAX) 2 MG tablet TAKE 1 TABLET BY MOUTH ONCE DAILY AS NEEDED FOR ANXIETY FOR UP TO 30 DAYS 30 Tab 0   • temazepam (RESTORIL) 30 MG capsule Take 1 Cap by mouth at bedtime as needed for Sleep for up to 30 days. 30 Cap 0   • Ferrous Sulfate (IRON CR PO) Take 1 Tab by mouth every day.     • Multiple Vitamin (MULTIVITAMIN PO) Take 1 Tab by mouth every day.     • CALCIUM PO Take 1 Tab by mouth 3 times a day.     • VITAMIN D PO Take 2 Tabs by mouth 2 Times a Day.       No current facility-administered medications for this visit.      She  has a past medical history of Anxiety, Arthritis, Breast cancer (HCC), Cancer ([facility]) (1993), Dental disorder, Depression, and Other specified symptom associated with female genital organs. She also has no past medical history of Hypertension.    ROS as stated in hpi  No chest pain, no shortness of breath, no abdominal pain       Objective:     /86 (BP Location: Left arm, Patient Position: Sitting)   Pulse 100   Temp 36.3 °C (97.3 °F) (Temporal)   Resp 16   Ht 1.619 m (5' 3.75\")   Wt 63 kg (139 lb)   SpO2 94%  Body mass index is 24.05 kg/m².   Physical Exam:  Constitutional: Alert, no distress.  Skin: Warm, dry, good turgor,no cyanosis, no rashes in visible areas.  Eye: Equal, round and reactive, conjunctiva " clear, lids normal.  Ears: No tenderness, no discharge.  External canals are without any significant edema or erythema.  Tympanic membranes are without any inflammation, no effusion.  Gross auditory acuity is intact.  Nose: symmetrical without tenderness, no discharge.  Mouth/Throat: lips without lesion.  Oropharynx clear.  Throat without erythema, exudates or tonsillar enlargement.  Neck: Trachea midline, no masses, no obvious thyroid enlargement.. No cervical or supraclavicular lymphadenopathy. Range of motion within normal limits.  Neuro: Cranial nerves 2-12 grossly intact.  No sensory deficit.  Respiratory: Unlabored respiratory effort, lungs clear to auscultation, no wheezes, no ronchi.  Cardiovascular: Normal S1, S2, no murmur, no edema.  Abdomen: Soft, non-tender, no masses, no guarding,  no hepatosplenomegaly.  Psych: Alert and oriented x3, normal affect and mood and judgement.        Assessment and Plan:   The following treatment plan was discussed    1. Annual physical exam  Here for annual exam.  Due for FIT.  Up to date on mammogram.  Due for labs.  Orders provided.  Discussed exercise and self care today.  Surgical menopause.   - Lipid Profile; Future  - TSH; Future  - FREE THYROXINE; Future    2. Anxiety  Chronic, ongoing, occasional xanax reported.  No  discrepancies.  UDS/agreement today.  Monitor and follow.   - Pain Management Screen; Future  - Controlled Substance Treatment Agreement    3. Psychophysiological insomnia  Chronic, ongoing. Stable good sleep with temazepam.  UDS/agreement today.  Monitor and follow.  Do not take xanax at same time.  She follows this completely.   - Pain Management Screen; Future  - Controlled Substance Treatment Agreement    4. Encounter for screening fecal occult blood testing  Due for fit.  Order and kit provided.  Monitor and follow.   - OCCULT BLOOD FECES IMMUNOASSAY; Future    5. Moderate episode of recurrent major depressive disorder (HCC)  Chronic, ongoing.  phq9 today WNL.  No meds currently.  In remission.   - CBC WITH DIFFERENTIAL; Future  - Comp Metabolic Panel; Future    6. Chronic fatigue  Chronic, ongoing.  Due for Vit D check.  Encouraged supplementation monitor and follow.   - VITAMIN D,25 HYDROXY; Future      Followup: Return in about 1 year (around 8/24/2021) for Annual.         Educated in proper administration of medication(s) ordered today including safety, possible SE, risks, benefits, rationale and alternatives to therapy.     Please note that this dictation was created using voice recognition software. I have made every reasonable attempt to correct obvious errors, but I expect that there are errors of grammar and possibly content that I did not discover before finalizing the note.

## 2020-08-25 NOTE — PATIENT INSTRUCTIONS
FACE COVID    F = Focus on what's in your control   A = Acknowledge your thoughts & feelings   C = Come back into your body   E = Engage in what you're doing     C = Committed action   O = Opening up   V = Values   I = Identify resources   D = Disinfect & distance    F = Focus on what's in your control    COVID-19 crisis can affect us in many different ways: physically, emotionally, economically, socially, and psychologically. All of us are (or soon will be) dealing with the very real challenges of widespread serious illness and the inabilities of healthcare systems to cope with it, social and community disruption, economic fallout and financial problems , obstacles and interruptions to many aspects of life … and the list goes on. And when we are facing a crisis of any sort, fear and anxiety are inevitable; they are normal, natural responses to challenging situations infused with danger and uncertainty.  We his humans tend to overestimate the threat/danger. It's all too easy to get lost in worrying and ruminating about all sorts of things that are out of your control: what might happen in the future; how the virus might affect you or your loved ones or your community or your country or the world - and what will happen then - and so on. And while it's completely natural for us to get lost in such worries, it's not useful or helpful. Indeed the more we focus on what's not in our control, the more hopeless or anxious we're likely to feel.   So the single most useful thing anyone can do in any type of crisis - Corona-related or otherwise - is to: focus on what's in your control. You can't control what happens in the future. You can't control Corona virus itself or the world economy or how your government manages this whole sordid mess. And you can't magically control your feelings, eliminating all that perfectly natural fear and anxiety. But you can control what you do - here and now. And that matters.  The reality  is, we all have far more control over our behavior, than we do over our thoughts and feelings. So our number one aim is to take control of our behavior - right here and now - to respond effectively to this crisis.    A = Acknowledge your thoughts and feelings  Silently and kindly acknowledge whatever is 'showing up' inside you: thoughts, feelings, emotions, memories, sensation, urges. Take the stance of a curious , observing what's going on in your inner world.  Avoidance of thoughts tend to make them worse over time.    C = Come back into your body  Come back into and connect with your physical body. Find your own way of doing this. You could try some or all of the following, or find your own methods:   • Slowly pushing your feet hard into the floor.   • Slowly straightening up your back and spine; if sitting, sitting upright and forward in your chair. Slowly pressing your fingertips together   • Slowly stretching your arms or neck, shrugging your shoulders.   • Slowly breathing  •   E = Engage in what you're doing  Get a sense of where you are and refocus your attention on the activity you are doing. Find your own way of doing this. You could try some or all of the following suggestions, or find your own methods:   • Look around the room and notice 5 things you can see.   • Notice 3 or 4 things you can hear.   • Notice what you can smell or taste or sense in your nose and mouth   • Notice what you are doing   • End the exercise by giving your full attention to the task or activity at hand. Ideally, run through the ACE cycle slowly 3 or 4 times, to turn it into a 2- 3 minute exercise.  C = Committed action  Committed action means effective action, guided by your core values; action you take because it's truly important to you; action you take even if it brings up difficult thoughts and feelings.  • Now obviously that includes all those protective measures against Corona - frequent handwashing, social  distancing, and so on.   • Can you say some kind words to someone in distress - in person or via a phone call or text message?   • Can you help someone out with a task or a chore, or cook a meal, or hold someone's hand, or play a game with a young child?   • Can you comfort and soothe someone who is sick?   • And if you're spending a lot more time at home, through self-isolation or forced quarantine, or social distancing, what are the most effective ways to spend that time?    O = Opening up  Opening up means making room for difficult feelings and being kind to yourself. Difficult feelings are guaranteed to keep on showing up as this crisis unfolds: fear, anxiety, anger, sadness, guilt, loneliness, frustration, confusion, and many more.  We can't stop them from arising; they're normal reactions.     V = Values  Committed action should be guided by your core values:   What do you want to stand for in the face of this crisis?   What sort of person do you want to be, as you go through this?  How do you want to treat yourself and others?   Your values might include love, respect, humor, patience, courage, honesty, caring, openness, kindness …. or numerous others. Look for ways to 'sprinkle' these values into your day. Let them guide and motivate your committed action.  What are kind, caring ways you can treat yourself as you go through this?   What are kind words you can say to yourself, kind deeds you can do for yourself?   What are kind ways you can treat others who are suffering?   What are kind, caring ways of contributing to the wellbeing of your community?   What can you say and do that will enable you to look back in years to come and feel proud of your response?    I = Identify resources  Identify reliable resources for help, assistance, support, and advice. This includes friends, family, neighbors, health professionals, emergency services. And make sure you know the emergency helpline phone numbers, including  psychological help if required.   The Center for Disease Control (CDC)  Use this information to develop your own resources: action plans to protect yourself and others, and to prepare in advance for quarantine or emergency.    D = Disinfect & distance  Disinfect your hands regularly and practice as much social distancing as realistically possible, for the greater good of your community. And remember, we're talking about physical distancing - not cutting off emotionally!    If you need additional resources reach out to your Primary Care Provider team.

## 2020-08-28 DIAGNOSIS — Z12.11 ENCOUNTER FOR SCREENING FECAL OCCULT BLOOD TESTING: ICD-10-CM

## 2020-08-30 LAB — HEMOCCULT STL QL IA: NEGATIVE

## 2020-08-31 DIAGNOSIS — F51.04 PSYCHOPHYSIOLOGICAL INSOMNIA: ICD-10-CM

## 2020-08-31 RX ORDER — TEMAZEPAM 30 MG/1
30 CAPSULE ORAL NIGHTLY PRN
Qty: 30 CAP | Refills: 1 | Status: SHIPPED | OUTPATIENT
Start: 2020-08-31 | End: 2020-09-30 | Stop reason: SDUPTHER

## 2020-09-14 DIAGNOSIS — F41.9 ANXIETY: ICD-10-CM

## 2020-09-14 RX ORDER — ALPRAZOLAM 2 MG/1
TABLET ORAL
Qty: 30 TAB | Refills: 1 | Status: SHIPPED | OUTPATIENT
Start: 2020-09-14 | End: 2020-10-13 | Stop reason: SDUPTHER

## 2020-09-14 NOTE — TELEPHONE ENCOUNTER
Requested Prescriptions     Signed Prescriptions Disp Refills   • alprazolam (XANAX) 2 MG tablet 30 Tab 1     Sig: TAKE 1 TABLET BY MOUTH ONCE DAILY AS NEEDED FOR ANXIETY FOR UP TO 30 DAYS     Authorizing Provider: MINERVA LUAREANO A.P.R.N.

## 2020-09-30 ENCOUNTER — PATIENT MESSAGE (OUTPATIENT)
Dept: MEDICAL GROUP | Facility: PHYSICIAN GROUP | Age: 65
End: 2020-09-30

## 2020-09-30 DIAGNOSIS — F51.04 PSYCHOPHYSIOLOGICAL INSOMNIA: ICD-10-CM

## 2020-09-30 RX ORDER — TEMAZEPAM 30 MG/1
30 CAPSULE ORAL NIGHTLY PRN
Qty: 30 CAP | Refills: 1 | Status: SHIPPED | OUTPATIENT
Start: 2020-09-30 | End: 2020-12-01 | Stop reason: SDUPTHER

## 2020-09-30 NOTE — PATIENT COMMUNICATION
Received request via: Patient    Was the patient seen in the last year in this department? Yes on 08/24/2020    Does the patient have an active prescription (recently filled or refills available) for medication(s) requested? No

## 2020-09-30 NOTE — PROGRESS NOTES
Requested Prescriptions     Signed Prescriptions Disp Refills   • temazepam (RESTORIL) 30 MG capsule 30 Cap 1     Sig: Take 1 Cap by mouth at bedtime as needed for Sleep for up to 30 days.     Authorizing Provider: MINERVA LAUREANO OK\PDMP check ok  JUNAID Chou

## 2020-10-13 DIAGNOSIS — F41.9 ANXIETY: ICD-10-CM

## 2020-10-13 NOTE — TELEPHONE ENCOUNTER
Received request via: Patient    Was the patient seen in the last year in this department? Yes LOV 08/24/2020    Does the patient have an active prescription (recently filled or refills available) for medication(s) requested? No

## 2020-10-14 RX ORDER — ALPRAZOLAM 2 MG/1
2 TABLET ORAL
Qty: 30 TAB | Refills: 0 | Status: SHIPPED | OUTPATIENT
Start: 2020-10-14 | End: 2020-11-16 | Stop reason: SDUPTHER

## 2020-10-15 NOTE — TELEPHONE ENCOUNTER
Requested Prescriptions     Pending Prescriptions Disp Refills   • alprazolam (XANAX) 2 MG tablet 30 Tab 0     Sig: Take 1 Tab by mouth 1 time daily as needed for Anxiety for up to 30 days.       JUNAID Cronin    Obtained and reviewed patient utilization report from [facility] pharmacy database on 10/14/2020 prior to writing prescription for controlled substance II, III or IV per [place] bill . Based on assessment of the report, the prescription xanax is medically necessary.

## 2020-11-16 DIAGNOSIS — F41.9 ANXIETY: ICD-10-CM

## 2020-11-16 RX ORDER — ALPRAZOLAM 2 MG/1
2 TABLET ORAL
Qty: 30 TAB | Refills: 0 | Status: SHIPPED | OUTPATIENT
Start: 2020-11-16 | End: 2020-12-17 | Stop reason: SDUPTHER

## 2020-11-16 NOTE — TELEPHONE ENCOUNTER
Received request via: Pharmacy    Was the patient seen in the last year in this department? Yes 8/24/20    Does the patient have an active prescription (recently filled or refills available) for medication(s) requested? No

## 2020-12-01 ENCOUNTER — PATIENT MESSAGE (OUTPATIENT)
Dept: MEDICAL GROUP | Facility: PHYSICIAN GROUP | Age: 65
End: 2020-12-01

## 2020-12-01 DIAGNOSIS — F51.04 PSYCHOPHYSIOLOGICAL INSOMNIA: ICD-10-CM

## 2020-12-01 RX ORDER — TEMAZEPAM 30 MG/1
30 CAPSULE ORAL NIGHTLY PRN
Qty: 30 CAP | Refills: 0 | Status: SHIPPED | OUTPATIENT
Start: 2020-12-01 | End: 2020-12-30 | Stop reason: SDUPTHER

## 2020-12-17 ENCOUNTER — PATIENT MESSAGE (OUTPATIENT)
Dept: MEDICAL GROUP | Facility: PHYSICIAN GROUP | Age: 65
End: 2020-12-17

## 2020-12-17 DIAGNOSIS — F41.9 ANXIETY: ICD-10-CM

## 2020-12-17 RX ORDER — ALPRAZOLAM 2 MG/1
2 TABLET ORAL
Qty: 30 TAB | Refills: 0 | Status: SHIPPED | OUTPATIENT
Start: 2020-12-17 | End: 2021-01-18 | Stop reason: SDUPTHER

## 2020-12-17 NOTE — PROGRESS NOTES
Requested Prescriptions     Signed Prescriptions Disp Refills   • alprazolam (XANAX) 2 MG tablet 30 Tab 0     Sig: Take 1 Tab by mouth 1 time a day as needed for Anxiety for up to 30 days.     Authorizing Provider: MINERVA LAUREANO, PDMP reviewed  JUNAID Chou

## 2020-12-30 ENCOUNTER — PATIENT MESSAGE (OUTPATIENT)
Dept: MEDICAL GROUP | Facility: PHYSICIAN GROUP | Age: 65
End: 2020-12-30

## 2020-12-30 DIAGNOSIS — F51.04 PSYCHOPHYSIOLOGICAL INSOMNIA: ICD-10-CM

## 2020-12-30 RX ORDER — TEMAZEPAM 30 MG/1
30 CAPSULE ORAL NIGHTLY PRN
Qty: 30 CAP | Refills: 0 | Status: SHIPPED | OUTPATIENT
Start: 2020-12-30 | End: 2021-02-02 | Stop reason: SDUPTHER

## 2021-01-18 ENCOUNTER — PATIENT MESSAGE (OUTPATIENT)
Dept: MEDICAL GROUP | Facility: PHYSICIAN GROUP | Age: 66
End: 2021-01-18

## 2021-01-18 DIAGNOSIS — F41.9 ANXIETY: ICD-10-CM

## 2021-01-18 RX ORDER — ALPRAZOLAM 2 MG/1
2 TABLET ORAL
Qty: 30 TAB | Refills: 0 | Status: SHIPPED | OUTPATIENT
Start: 2021-01-18 | End: 2021-02-19 | Stop reason: SDUPTHER

## 2021-01-19 NOTE — PROGRESS NOTES
Requested Prescriptions     Signed Prescriptions Disp Refills   • alprazolam (XANAX) 2 MG tablet 30 Tab 0     Sig: Take 1 Tab by mouth 1 time a day as needed for Anxiety for up to 30 days.     Authorizing Provider: MINERVA LAUREANO A.P.R.N.

## 2021-01-20 ENCOUNTER — PATIENT MESSAGE (OUTPATIENT)
Dept: MEDICAL GROUP | Facility: PHYSICIAN GROUP | Age: 66
End: 2021-01-20

## 2021-01-20 DIAGNOSIS — F41.9 ANXIETY: ICD-10-CM

## 2021-01-20 RX ORDER — ALPRAZOLAM 2 MG/1
TABLET ORAL
Qty: 30 TAB | Refills: 0 | OUTPATIENT
Start: 2021-01-20

## 2021-01-20 NOTE — TELEPHONE ENCOUNTER
Changes Requested     Name from pharmacy: XANAX 2MG           TAB         Will file in chart as: alprazolam (XANAX) 2 MG tablet    Sig: TAKE 1 TABLET BY MOUTH ONCE DAILY AS NEEDED FOR ANXIETY FOR  UP  TO  30  DAYS    Disp:  30 Tab (Pharmacy requested: 30 Each)    Refills:  0    Start: 1/20/2021    Class: Normal    For: Anxiety    Last ordered: 2 days ago by JUNAID Chou     Rx #: [id number]    Pharmacy comment: Conflict with other medication, disease or allergy. Need to verify why patient is on both benzos. Please call [place] @ [phone].       To be filled at: [facility] [address] [place] [address]

## 2021-02-02 ENCOUNTER — PATIENT MESSAGE (OUTPATIENT)
Dept: MEDICAL GROUP | Facility: PHYSICIAN GROUP | Age: 66
End: 2021-02-02

## 2021-02-02 DIAGNOSIS — F51.04 PSYCHOPHYSIOLOGICAL INSOMNIA: ICD-10-CM

## 2021-02-02 RX ORDER — TEMAZEPAM 30 MG/1
30 CAPSULE ORAL NIGHTLY PRN
Qty: 30 CAP | Refills: 0 | Status: SHIPPED | OUTPATIENT
Start: 2021-02-02 | End: 2021-03-03 | Stop reason: SDUPTHER

## 2021-02-02 NOTE — PROGRESS NOTES
Requested Prescriptions     Signed Prescriptions Disp Refills   • temazepam (RESTORIL) 30 MG capsule 30 Cap 0     Sig: Take 1 Cap by mouth at bedtime as needed for Sleep for up to 30 days.     Authorizing Provider: MINERVA LAUREANO   PDMP JUNAID Lopez

## 2021-02-19 ENCOUNTER — PATIENT MESSAGE (OUTPATIENT)
Dept: MEDICAL GROUP | Facility: PHYSICIAN GROUP | Age: 66
End: 2021-02-19

## 2021-02-19 DIAGNOSIS — F41.9 ANXIETY: ICD-10-CM

## 2021-02-19 RX ORDER — ALPRAZOLAM 2 MG/1
2 TABLET ORAL
Qty: 30 TABLET | Refills: 0 | Status: SHIPPED | OUTPATIENT
Start: 2021-02-19 | End: 2021-03-22 | Stop reason: SDUPTHER

## 2021-02-20 NOTE — PROGRESS NOTES
Requested Prescriptions     Signed Prescriptions Disp Refills   • alprazolam (XANAX) 2 MG tablet 30 tablet 0     Sig: Take 1 tablet by mouth 1 time a day as needed for Anxiety for up to 30 days.     Authorizing Provider: BENI RIVERA reviewed today    GIFTY Chapman.

## 2021-03-03 ENCOUNTER — PATIENT MESSAGE (OUTPATIENT)
Dept: MEDICAL GROUP | Facility: PHYSICIAN GROUP | Age: 66
End: 2021-03-03

## 2021-03-03 DIAGNOSIS — F51.04 PSYCHOPHYSIOLOGICAL INSOMNIA: ICD-10-CM

## 2021-03-03 RX ORDER — TEMAZEPAM 30 MG/1
30 CAPSULE ORAL NIGHTLY PRN
Qty: 30 CAPSULE | Refills: 1 | Status: SHIPPED | OUTPATIENT
Start: 2021-03-03 | End: 2021-05-05 | Stop reason: SDUPTHER

## 2021-03-03 NOTE — PROGRESS NOTES
Requested Prescriptions     Signed Prescriptions Disp Refills   • temazepam (RESTORIL) 30 MG capsule 30 capsule 1     Sig: Take 1 capsule by mouth at bedtime as needed for Sleep for up to 30 days.     Authorizing Provider: MINERVA LAUREANO ok  PDMP ok  JUNAID Chou

## 2021-03-05 ENCOUNTER — PATIENT MESSAGE (OUTPATIENT)
Dept: MEDICAL GROUP | Facility: PHYSICIAN GROUP | Age: 66
End: 2021-03-05

## 2021-03-05 ENCOUNTER — TELEPHONE (OUTPATIENT)
Dept: MEDICAL GROUP | Facility: PHYSICIAN GROUP | Age: 66
End: 2021-03-05

## 2021-03-05 NOTE — TELEPHONE ENCOUNTER
1. Caller Name: Lupe Thompson                        Call Back Number: [phone] (home) [phone] (work)      How would the patient prefer to be contacted with a response:     Pt called stating the Pharmacy had not received her prescription of temazepam (RESTORIL) 30 MG capsule.  See message below:  temazepam (RESTORIL) 30 MG capsule 30 capsule 1/1 3/3/2021 4/2/2021    Sig - Route: Take 1 capsule by mouth at bedtime as needed for Sleep for up to 30 days. - Oral    Sent to pharmacy as: Temazepam 30 MG Oral Capsule (RESTORIL)    E-Prescribing Status: Verification status not available for this order       Called Pharmacy and did a verbal.

## 2021-03-06 NOTE — TELEPHONE ENCOUNTER
From: Lupe Thompson  To: Nurse Debbie Sánchez  Sent: 3/5/2021 2:25 PM PST  Subject: Prescription Question    I just talked to the [facility] in [place] and they do not have this prescription. I there any way someone can call them. I am completely out of this medication. Thank you.      ----- Message -----   From:Nurse Debbie Sánchez   Sent:3/3/2021 8:13 AM PST   To:Lupe Thompson   Subject:RE: Prescription Question    Lupe  I have sent over the temazepam plus one refill to your pharmacy  Have a great day  JUNAID Chou      ----- Message -----   From:Lupe Thompson   Sent:3/3/2021 7:54 AM PST   To:Nurse Debbie Sánchez   Subject:Prescription Question    Please call in a refill for Temazepam to the [facility] pharmacy in [place]. Thank you.

## 2021-03-22 DIAGNOSIS — F41.9 ANXIETY: ICD-10-CM

## 2021-03-23 RX ORDER — ALPRAZOLAM 2 MG/1
2 TABLET ORAL
Qty: 30 TABLET | Refills: 1 | Status: SHIPPED | OUTPATIENT
Start: 2021-03-23 | End: 2021-05-24 | Stop reason: SDUPTHER

## 2021-03-23 NOTE — TELEPHONE ENCOUNTER
Requested Prescriptions     Signed Prescriptions Disp Refills   • alprazolam (XANAX) 2 MG tablet 30 tablet 1     Sig: Take 1 tablet by mouth 1 time a day as needed for Anxiety for up to 30 days.     Authorizing Provider: MINERVA LAUREANO     Please let patient know I have filled this plus added one refill  GIFTY Chou.

## 2021-05-05 DIAGNOSIS — F51.04 PSYCHOPHYSIOLOGICAL INSOMNIA: ICD-10-CM

## 2021-05-05 RX ORDER — TEMAZEPAM 30 MG/1
30 CAPSULE ORAL NIGHTLY PRN
Qty: 30 CAPSULE | Refills: 0 | Status: SHIPPED | OUTPATIENT
Start: 2021-05-05 | End: 2021-06-02 | Stop reason: SDUPTHER

## 2021-05-05 NOTE — TELEPHONE ENCOUNTER
Requested Prescriptions     Signed Prescriptions Disp Refills   • temazepam (RESTORIL) 30 MG capsule 30 capsule 0     Sig: Take 1 capsule by mouth at bedtime as needed for Sleep for up to 30 days.     Authorizing Provider: MINERVA LAUREANO A.P.R.N.

## 2021-05-05 NOTE — TELEPHONE ENCOUNTER
Received request via: Patient    Was the patient seen in the last year in this department? Yes 8/24/20    Does the patient have an active prescription (recently filled or refills available) for medication(s) requested? No

## 2021-05-24 ENCOUNTER — PATIENT MESSAGE (OUTPATIENT)
Dept: MEDICAL GROUP | Facility: PHYSICIAN GROUP | Age: 66
End: 2021-05-24

## 2021-05-24 DIAGNOSIS — F41.9 ANXIETY: ICD-10-CM

## 2021-05-24 RX ORDER — ALPRAZOLAM 2 MG/1
2 TABLET ORAL
Qty: 30 TABLET | Refills: 0 | Status: SHIPPED | OUTPATIENT
Start: 2021-05-24 | End: 2021-06-24 | Stop reason: SDUPTHER

## 2021-05-24 NOTE — PROGRESS NOTES
Requested Prescriptions     Signed Prescriptions Disp Refills   • alprazolam (XANAX) 2 MG tablet 30 tablet 0     Sig: Take 1 tablet by mouth 1 time a day as needed for Anxiety for up to 30 days.     Authorizing Provider: MINERVA LAUREANO ok, JUNAID Lee

## 2021-06-02 ENCOUNTER — PATIENT MESSAGE (OUTPATIENT)
Dept: MEDICAL GROUP | Facility: PHYSICIAN GROUP | Age: 66
End: 2021-06-02

## 2021-06-02 DIAGNOSIS — F51.04 PSYCHOPHYSIOLOGICAL INSOMNIA: ICD-10-CM

## 2021-06-02 RX ORDER — TEMAZEPAM 30 MG/1
30 CAPSULE ORAL NIGHTLY PRN
Qty: 30 CAPSULE | Refills: 0 | Status: SHIPPED | OUTPATIENT
Start: 2021-06-02 | End: 2021-07-06 | Stop reason: SDUPTHER

## 2021-06-24 DIAGNOSIS — F41.9 ANXIETY: ICD-10-CM

## 2021-06-24 RX ORDER — ALPRAZOLAM 2 MG/1
2 TABLET ORAL
Qty: 30 TABLET | Refills: 0 | Status: SHIPPED | OUTPATIENT
Start: 2021-06-24 | End: 2021-07-26

## 2021-06-24 NOTE — TELEPHONE ENCOUNTER
Received request via: Patient    Was the patient seen in the last year in this department? Yes last seen 8/24/2020    Does the patient have an active prescription (recently filled or refills available) for medication(s) requested? No

## 2021-06-24 NOTE — TELEPHONE ENCOUNTER
Requested Prescriptions     Signed Prescriptions Disp Refills   • alprazolam (XANAX) 2 MG tablet 30 tablet 0     Sig: Take 1 tablet by mouth 1 time a day as needed for Anxiety for up to 30 days.     Authorizing Provider: MINERVA LAUREANO   needs appointment in august  JUNAID Chou

## 2021-06-24 NOTE — TELEPHONE ENCOUNTER
Phone Number Called:  [phone] (work)      Call outcome: Spoke to patient regarding message below.    Message: needs appointment in august  JUNAID Chou     APPOINTMENT SCHEDULED

## 2021-07-06 DIAGNOSIS — F51.04 PSYCHOPHYSIOLOGICAL INSOMNIA: ICD-10-CM

## 2021-07-06 RX ORDER — TEMAZEPAM 30 MG/1
30 CAPSULE ORAL NIGHTLY PRN
Qty: 30 CAPSULE | Refills: 0 | Status: SHIPPED | OUTPATIENT
Start: 2021-07-06 | End: 2021-08-06 | Stop reason: SDUPTHER

## 2021-07-24 DIAGNOSIS — F41.9 ANXIETY: ICD-10-CM

## 2021-07-26 RX ORDER — ALPRAZOLAM 2 MG/1
TABLET ORAL
Qty: 30 TABLET | Refills: 0 | Status: SHIPPED | OUTPATIENT
Start: 2021-07-26 | End: 2021-08-26 | Stop reason: SDUPTHER

## 2021-07-26 NOTE — TELEPHONE ENCOUNTER
Requested Prescriptions     Signed Prescriptions Disp Refills   • alprazolam (XANAX) 2 MG tablet 30 tablet 0     Sig: TAKE 1 TABLET BY MOUTH 1 ONCE DAILY AS NEEDED FOR ANXIETY FOR  UP  TO  30  DAYS.  *  DUE  FOR  YEARLY  APPOINTMENT IN AUGUST  *     Authorizing Provider: BENI RIVERA reviewed today     GIFTY Chapman.

## 2021-08-06 ENCOUNTER — OFFICE VISIT (OUTPATIENT)
Dept: MEDICAL GROUP | Facility: PHYSICIAN GROUP | Age: 66
End: 2021-08-06
Payer: COMMERCIAL

## 2021-08-06 VITALS
SYSTOLIC BLOOD PRESSURE: 128 MMHG | HEART RATE: 78 BPM | RESPIRATION RATE: 16 BRPM | OXYGEN SATURATION: 95 % | HEIGHT: 64 IN | DIASTOLIC BLOOD PRESSURE: 86 MMHG | TEMPERATURE: 96.8 F | WEIGHT: 133 LBS | BODY MASS INDEX: 22.71 KG/M2

## 2021-08-06 DIAGNOSIS — E55.9 VITAMIN D DEFICIENCY: ICD-10-CM

## 2021-08-06 DIAGNOSIS — F33.1 MODERATE EPISODE OF RECURRENT MAJOR DEPRESSIVE DISORDER (HCC): ICD-10-CM

## 2021-08-06 DIAGNOSIS — E03.8 SUBCLINICAL HYPOTHYROIDISM: ICD-10-CM

## 2021-08-06 DIAGNOSIS — F51.04 PSYCHOPHYSIOLOGICAL INSOMNIA: ICD-10-CM

## 2021-08-06 DIAGNOSIS — Z09 HOSPITAL DISCHARGE FOLLOW-UP: ICD-10-CM

## 2021-08-06 PROCEDURE — 99214 OFFICE O/P EST MOD 30 MIN: CPT | Performed by: NURSE PRACTITIONER

## 2021-08-06 RX ORDER — TEMAZEPAM 30 MG/1
30 CAPSULE ORAL NIGHTLY PRN
Qty: 30 CAPSULE | Refills: 1 | Status: SHIPPED | OUTPATIENT
Start: 2021-08-06 | End: 2021-10-11 | Stop reason: SDUPTHER

## 2021-08-06 RX ORDER — FLUOXETINE HYDROCHLORIDE 40 MG/1
40 CAPSULE ORAL DAILY
Qty: 30 CAPSULE | Refills: 1 | Status: SHIPPED | OUTPATIENT
Start: 2021-08-06 | End: 2021-10-25

## 2021-08-06 RX ORDER — LIOTHYRONINE SODIUM 5 UG/1
5 TABLET ORAL
COMMUNITY
Start: 2021-07-16 | End: 2021-08-18 | Stop reason: SDUPTHER

## 2021-08-06 NOTE — ASSESSMENT & PLAN NOTE
Reports inreasing depressive symptoms, feels like a black cloud, dark tunnel. Would like something to help.  Sertraline did not help her.  Recently retired.  Feels loss of purpose.  Denies suicidal thoughts/plan.  Walking daily, eating welll and working on self care.

## 2021-08-06 NOTE — ASSESSMENT & PLAN NOTE
Recent hospitalization at [facility] for weakness, dizziness.  All testing/xrays/CT reviewed with normal findings.  Started on low dose cytomel and vitamind D.  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  She is reporting improvement in her energy levels.

## 2021-08-06 NOTE — ASSESSMENT & PLAN NOTE
Acute finding in recent ER visit.  Started on 5000 units daily.  Feeling some better.  Recheck labs in 2 months.

## 2021-08-06 NOTE — PROGRESS NOTES
Chief Complaint   Patient presents with   • Hospital Follow-up   • Depression       Subjective:   Lupe Thompson is a 65 y.o. female here today for evaluation and management of:    Moderate episode of recurrent major depressive disorder (HCC)  Reports inreasing depressive symptoms, feels like a black cloud, dark tunnel. Would like something to help.  Sertraline did not help her.  Recently retired.  Feels loss of purpose.  Denies suicidal thoughts/plan.  Walking daily, eating welll and working on self care.     Subclinical hypothyroidism  Recent hospitalization with low dose thyroid medication given.  Reports feeling some more energy.  Will recheck labs in 2 months. No thyroid enlargement on exam.     Vitamin D deficiency  Acute finding in recent ER visit.  Started on 5000 units daily.  Feeling some better.  Recheck labs in 2 months.     Hospital discharge follow-up  Recent hospitalization at [facility] for weakness, dizziness.  All testing/xrays/CT reviewed with normal findings.  Started on low dose cytomel and vitamind D.  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  She is reporting improvement in her energy levels.             Current medicines (including changes today)  Current Outpatient Medications   Medication Sig Dispense Refill   • Cholecalciferol (VITAMIN D3) 125 MCG (5000 UT) Cap TAKE 1 CAPSULE BY MOUTH ONCE DAILY FOR 30 DAYS     • liothyronine (CYTOMEL) 5 MCG Tab Take 5 mcg by mouth.     • fluoxetine (PROZAC) 40 MG capsule Take 1 capsule by mouth every day. 30 capsule 1   • temazepam (RESTORIL) 30 MG capsule Take 1 capsule by mouth at bedtime as needed for Sleep for up to 30 days. 30 capsule 1   • alprazolam (XANAX) 2 MG tablet TAKE 1 TABLET BY MOUTH 1 ONCE DAILY AS NEEDED FOR ANXIETY FOR  UP  TO  30  DAYS.  *  DUE  FOR  YEARLY  APPOINTMENT IN AUGUST  * 30 tablet 0   • Ferrous Sulfate (IRON CR PO)  "Take 1 Tab by mouth every day.     • Multiple Vitamin (MULTIVITAMIN PO) Take 1 Tab by mouth every day.     • CALCIUM PO Take 1 Tab by mouth 3 times a day.     • VITAMIN D PO Take 2 Tabs by mouth 2 Times a Day.       No current facility-administered medications for this visit.     She  has a past medical history of Anxiety, Arthritis, Breast cancer (HCC), Cancer (HCC) (1993), Dental disorder, Depression, and Other specified symptom associated with female genital organs. She also has no past medical history of Hypertension.    ROS as stated in hpi  No chest pain, no shortness of breath, no abdominal pain       Objective:     /86 (BP Location: Left arm, Patient Position: Sitting)   Pulse 78   Temp 36 °C (96.8 °F)   Resp 16   Ht 1.619 m (5' 3.75\")   Wt 60.3 kg (133 lb)   SpO2 95%  Body mass index is 23.01 kg/m².   Physical Exam:  Constitutional: Alert, no distress.  Skin: Warm, dry, good turgor,no cyanosis, no rashes in visible areas.  Eye: Equal, round and reactive, conjunctiva clear, lids normal.  Neck: Trachea midline, no masses, no obvious thyroid enlargement.. No cervical or supraclavicular lymphadenopathy. Range of motion within normal limits.  Neuro: Cranial nerves 2-12 grossly intact.  No sensory deficit.  Respiratory: Unlabored respiratory effort, lungs clear to auscultation, no wheezes, no ronchi.  Cardiovascular: Normal S1, S2, no murmur, no edema.    Psych: Alert and oriented x3, normal affect and mood and judgement.        Assessment and Plan:   The following treatment plan was discussed    1. Vitamin D deficiency  Acute, non complex issue.  Continue with supplements.  Recheck labs in 2 months.     2. Subclinical hypothyroidism  Acute issue, non complex.  Continue thyroid med.  Recheck in 2 months.     3. Psychophysiological insomnia  Chronic, ongoing, refill today.  UDS OK, due at next visit.  PDMP ok  - temazepam (RESTORIL) 30 MG capsule; Take 1 capsule by mouth at bedtime as needed for Sleep " for up to 30 days.  Dispense: 30 capsule; Refill: 1    4. Moderate episode of recurrent major depressive disorder (HCC)  Chronic, with acute exacerbation.  Start fluoxetine 20 mg daily.  Recheck in 3 weeks.  Continue walking, diet, exercise.  Monitor and follow.     5. Hospital discharge follow-up  Acute issue, suspect exhaustion.  Feeling better with thyroid and vitamin d replacement and self care.  Recheck in 3 weeks.  See #4.  Monitor and follow.       Followup: Return in about 4 weeks (around 9/3/2021) for depression and anxiety.         Educated in proper administration of medication(s) ordered today including safety, possible SE, risks, benefits, rationale and alternatives to therapy.     Please note that this dictation was created using voice recognition software. I have made every reasonable attempt to correct obvious errors, but I expect that there are errors of grammar and possibly content that I did not discover before finalizing the note.

## 2021-08-06 NOTE — ASSESSMENT & PLAN NOTE
Recent hospitalization with low dose thyroid medication given.  Reports feeling some more energy.  Will recheck labs in 2 months. No thyroid enlargement on exam.

## 2021-08-18 RX ORDER — LIOTHYRONINE SODIUM 5 UG/1
5 TABLET ORAL DAILY
Qty: 30 TABLET | Refills: 3 | Status: SHIPPED | OUTPATIENT
Start: 2021-08-18

## 2021-08-18 NOTE — TELEPHONE ENCOUNTER
Requested Prescriptions     Signed Prescriptions Disp Refills   • Cholecalciferol (VITAMIN D3) 125 MCG (5000 UT) Cap 30 Capsule 11     Sig: TAKE 1 CAPSULE BY MOUTH ONCE DAILY FOR 30 DAYS     Authorizing Provider: MINERVA LAUREANO   • liothyronine (CYTOMEL) 5 MCG Tab 30 Tablet 3     Sig: Take 1 Tablet by mouth every day.     Authorizing Provider: MINERVA LAUREANO A.P.R.N.

## 2021-08-26 ENCOUNTER — OFFICE VISIT (OUTPATIENT)
Dept: MEDICAL GROUP | Facility: PHYSICIAN GROUP | Age: 66
End: 2021-08-26
Payer: COMMERCIAL

## 2021-08-26 ENCOUNTER — HOSPITAL ENCOUNTER (OUTPATIENT)
Dept: LAB | Facility: MEDICAL CENTER | Age: 66
End: 2021-08-26
Attending: NURSE PRACTITIONER
Payer: COMMERCIAL

## 2021-08-26 VITALS
TEMPERATURE: 97.3 F | HEIGHT: 64 IN | BODY MASS INDEX: 23.05 KG/M2 | SYSTOLIC BLOOD PRESSURE: 130 MMHG | OXYGEN SATURATION: 94 % | DIASTOLIC BLOOD PRESSURE: 86 MMHG | WEIGHT: 135 LBS | HEART RATE: 80 BPM | RESPIRATION RATE: 16 BRPM

## 2021-08-26 DIAGNOSIS — F33.1 MODERATE EPISODE OF RECURRENT MAJOR DEPRESSIVE DISORDER (HCC): ICD-10-CM

## 2021-08-26 DIAGNOSIS — F41.9 ANXIETY: ICD-10-CM

## 2021-08-26 DIAGNOSIS — F51.04 PSYCHOPHYSIOLOGICAL INSOMNIA: ICD-10-CM

## 2021-08-26 DIAGNOSIS — Z12.11 ENCOUNTER FOR SCREENING FECAL OCCULT BLOOD TESTING: ICD-10-CM

## 2021-08-26 DIAGNOSIS — Z00.00 ANNUAL PHYSICAL EXAM: ICD-10-CM

## 2021-08-26 DIAGNOSIS — Z12.31 ENCOUNTER FOR SCREENING MAMMOGRAM FOR MALIGNANT NEOPLASM OF BREAST: ICD-10-CM

## 2021-08-26 LAB
T3 SERPL-MCNC: 157 NG/DL (ref 60–181)
T4 FREE SERPL-MCNC: 0.99 NG/DL (ref 0.93–1.7)
TSH SERPL DL<=0.005 MIU/L-ACNC: 2.79 UIU/ML (ref 0.38–5.33)

## 2021-08-26 PROCEDURE — 84443 ASSAY THYROID STIM HORMONE: CPT

## 2021-08-26 PROCEDURE — 99397 PER PM REEVAL EST PAT 65+ YR: CPT | Performed by: NURSE PRACTITIONER

## 2021-08-26 PROCEDURE — 84439 ASSAY OF FREE THYROXINE: CPT

## 2021-08-26 PROCEDURE — 84480 ASSAY TRIIODOTHYRONINE (T3): CPT

## 2021-08-26 PROCEDURE — 36415 COLL VENOUS BLD VENIPUNCTURE: CPT

## 2021-08-26 RX ORDER — ALPRAZOLAM 2 MG/1
TABLET ORAL
Qty: 30 TABLET | Refills: 1 | Status: SHIPPED
Start: 2021-08-26 | End: 2021-10-25

## 2021-08-26 NOTE — ASSESSMENT & PLAN NOTE
Here for annual exam.  mammo and fit ordered.  Discussed vaccines, but she defers until she know about the booster for COVID.  Recently lost nephew with MD to covid pneumonia.  No chest paiin, shortness of breath, abdominal pain, ankle edema reported.  Normal findings on exam.

## 2021-08-26 NOTE — ASSESSMENT & PLAN NOTE
Reports that she has not noticed any improvement in her depression.   Would like to wait to see what thyroid panel looks like.

## 2021-08-26 NOTE — PROGRESS NOTES
Chief Complaint   Patient presents with   • Annual Exam   • Drug / Alcohol Assessment       Subjective:   Lupe Thompson is a 65 y.o. female here today for annual preventative exam.     Moderate episode of recurrent major depressive disorder (HCC)  Reports that she has not noticed any improvement in her depression.   Would like to wait to see what thyroid panel looks like.      Annual physical exam  Here for annual exam.  mammo and fit ordered.  Discussed vaccines, but she defers until she know about the booster for COVID.  Recently lost nephew with MD to covid pneumonia.  No chest paiin, shortness of breath, abdominal pain, ankle edema reported.  Normal findings on exam.           Current medicines (including changes today)  Current Outpatient Medications   Medication Sig Dispense Refill   • Cholecalciferol (VITAMIN D3) 125 MCG (5000 UT) Cap TAKE 1 CAPSULE BY MOUTH ONCE DAILY FOR 30 DAYS 30 Capsule 11   • liothyronine (CYTOMEL) 5 MCG Tab Take 1 Tablet by mouth every day. 30 Tablet 3   • fluoxetine (PROZAC) 40 MG capsule Take 1 capsule by mouth every day. 30 capsule 1   • temazepam (RESTORIL) 30 MG capsule Take 1 capsule by mouth at bedtime as needed for Sleep for up to 30 days. 30 capsule 1   • Ferrous Sulfate (IRON CR PO) Take 1 Tab by mouth every day.     • Multiple Vitamin (MULTIVITAMIN PO) Take 1 Tab by mouth every day.     • CALCIUM PO Take 1 Tab by mouth 3 times a day.     • VITAMIN D PO Take 2 Tabs by mouth 2 Times a Day.       No current facility-administered medications for this visit.     She  has a past medical history of Anxiety, Arthritis, Breast cancer (HCC), Cancer (HCC) (1993), Dental disorder, Depression, and Other specified symptom associated with female genital organs. She also has no past medical history of Hypertension.    ROS as stated in hpi  No chest pain, no shortness of breath, no abdominal pain       Objective:     /86 (BP Location: Left arm, Patient Position: Sitting)   Pulse  "80   Temp 36.3 °C (97.3 °F) (Temporal)   Resp 16   Ht 1.619 m (5' 3.75\")   Wt 61.2 kg (135 lb)   SpO2 94%  Body mass index is 23.35 kg/m². stable   Physical Exam:  Constitutional: Alert, no distress.  Skin: Warm, dry, good turgor,no cyanosis, no rashes in visible areas.  Eye: Equal, round and reactive, conjunctiva clear, lids normal.  Ears: No tenderness, no discharge.  External canals are without any significant edema or erythema.  Tympanic membranes are without any inflammation, no effusion.  Gross auditory acuity is intact.  Nose: symmetrical without tenderness, no discharge.  Mouth/Throat: lips without lesion.  Oropharynx clear.  Throat without erythema, exudates or tonsillar enlargement.  Neck: Trachea midline, no masses, no obvious thyroid enlargement.. No cervical or supraclavicular lymphadenopathy. Range of motion within normal limits.  Neuro: Cranial nerves 2-12 grossly intact.  No sensory deficit.  Respiratory: Unlabored respiratory effort, lungs clear to auscultation, no wheezes, no ronchi.  Cardiovascular: Normal S1, S2, no murmur, no edema.  Abdomen: Soft, non-tender, no masses, no guarding,  no hepatosplenomegaly.  Psych: Alert and oriented x3, normal affect and mood and judgement.        Assessment and Plan:   The following treatment plan was discussed    1. Psychophysiological insomnia  Chornic, ongoing, temazepam most nights.  UDS today. PDMP without issue.  Monitor.   - Pain Management Screen; Future  - Controlled Substance Treatment Agreement    2. Anxiety  Chronic, ongoing, due for UDS>  Decreasing need for xanax since pen[facility]ent[facility].  Encouraged a slow wean off.  Refill today.    - Pain Management Screen; Future  - Controlled Substance Treatment Agreement    3. Encounter for screening mammogram for malignant neoplasm of breast  Due for mammogram.  Order provided.   - MA-SCREENING MAMMO BILAT W/CAD; Future    4. Encounter for screening fecal occult blood testing  Due for FIT.  No symptoms.  " Monitor and follow.   - OCCULT BLOOD FECES IMMUNOASSAY; Future    5. Moderate episode of recurrent major depressive disorder (HCC)  Chornic, ongoing, not improved with fluoxetine.  Will retest labs.  Monitor, follow, consider switching to levothyroxine or increasing prozac.  Monitor and follow.   - TSH; Future  - FREE THYROXINE; Future  - TRIIDOTHYRONINE; Future    6. Annual physical exam  Annual exam.    Discussion today about general wellness and lifestyle habits:   *engage in regular physical and social activities   *prevent falls and reduce trip hazards, using ambulatory aids, hearing and vision testing if appropriate   *skin care, including sunscreen      Followup: No follow-ups on file.         Educated in proper administration of medication(s) ordered today including safety, possible SE, risks, benefits, rationale and alternatives to therapy.     Please note that this dictation was created using voice recognition software. I have made every reasonable attempt to correct obvious errors, but I expect that there are errors of grammar and possibly content that I did not discover before finalizing the note.

## 2021-08-31 ENCOUNTER — HOSPITAL ENCOUNTER (OUTPATIENT)
Facility: MEDICAL CENTER | Age: 66
End: 2021-08-31
Attending: NURSE PRACTITIONER
Payer: COMMERCIAL

## 2021-08-31 PROCEDURE — 82274 ASSAY TEST FOR BLOOD FECAL: CPT

## 2021-09-09 DIAGNOSIS — Z12.11 ENCOUNTER FOR SCREENING FECAL OCCULT BLOOD TESTING: ICD-10-CM

## 2021-09-09 LAB — AMBIGUOUS DTTM AMBI4: NORMAL

## 2021-09-11 LAB — IMM ASSAY OCC BLD FITOB: NEGATIVE

## 2021-09-29 NOTE — TELEPHONE ENCOUNTER
Requested Prescriptions     Signed Prescriptions Disp Refills   • temazepam (RESTORIL) 30 MG capsule 30 Cap 1     Sig: Take 1 Cap by mouth at bedtime as needed for Sleep for up to 30 days.     Authorizing Provider: MINERVA LAUREANO A.P.R.N.     /

## 2021-10-11 DIAGNOSIS — F51.04 PSYCHOPHYSIOLOGICAL INSOMNIA: ICD-10-CM

## 2021-10-11 RX ORDER — TEMAZEPAM 30 MG/1
30 CAPSULE ORAL NIGHTLY PRN
Qty: 30 CAPSULE | Refills: 0 | Status: SHIPPED | OUTPATIENT
Start: 2021-10-11 | End: 2021-11-08

## 2021-10-23 DIAGNOSIS — F41.9 ANXIETY: ICD-10-CM

## 2021-10-25 RX ORDER — FLUOXETINE HYDROCHLORIDE 40 MG/1
CAPSULE ORAL
Qty: 90 CAPSULE | Refills: 1 | Status: SHIPPED | OUTPATIENT
Start: 2021-10-25

## 2021-10-25 RX ORDER — ALPRAZOLAM 2 MG/1
TABLET ORAL
Qty: 30 TABLET | Refills: 0 | Status: SHIPPED | OUTPATIENT
Start: 2021-10-25 | End: 2021-11-23

## 2021-10-25 NOTE — TELEPHONE ENCOUNTER
Requested Prescriptions     Signed Prescriptions Disp Refills   • fluoxetine (PROZAC) 40 MG capsule 90 Capsule 1     Sig: Take 1 capsule by mouth once daily     Authorizing Provider: MINERVA LAUREANO A.P.R.N.

## 2021-10-25 NOTE — TELEPHONE ENCOUNTER
Requested Prescriptions     Signed Prescriptions Disp Refills   • alprazolam (XANAX) 2 MG tablet 30 Tablet 0     Sig: TAKE 1 TABLET BY MOUTH ONCE DAILY AS NEEDED FOR ANXIETY FOR  UP  TO 30 days     Authorizing Provider: MINERVA LAUREANO A.P.R.N.

## 2021-11-07 DIAGNOSIS — F51.04 PSYCHOPHYSIOLOGICAL INSOMNIA: ICD-10-CM

## 2021-11-08 RX ORDER — TEMAZEPAM 30 MG/1
CAPSULE ORAL
Qty: 30 CAPSULE | Refills: 2 | Status: SHIPPED | OUTPATIENT
Start: 2021-11-08 | End: 2022-02-06

## 2021-11-08 NOTE — TELEPHONE ENCOUNTER
Requested Prescriptions     Signed Prescriptions Disp Refills   • temazepam (RESTORIL) 30 MG capsule 30 Capsule 2     Sig: TAKE 1 CAPSULE BY MOUTH ONCE DAILY AT BEDTIME AS NEEDED FOR SLEEP FOR UP TO 30 DAYS     Authorizing Provider: MINERVA LAUREANO A.P.R.N.

## 2021-11-22 DIAGNOSIS — F41.9 ANXIETY: ICD-10-CM

## 2021-11-23 RX ORDER — ALPRAZOLAM 2 MG/1
TABLET ORAL
Qty: 90 TABLET | Refills: 0 | Status: SHIPPED | OUTPATIENT
Start: 2021-11-23 | End: 2022-02-23

## 2021-11-23 NOTE — TELEPHONE ENCOUNTER
Requested Prescriptions     Signed Prescriptions Disp Refills   • alprazolam (XANAX) 2 MG tablet 90 Tablet 0     Sig: TAKE 1 TABLET BY MOUTH ONCE DAILY AS NEEDED FOR ANXIETY FOR UP TO 30 DAYS     Authorizing Provider: MINERVA LAUREANO     90 day supply  PDMP and UDS ok  JUNAID Chou

## 2021-11-27 DIAGNOSIS — F41.9 ANXIETY: ICD-10-CM

## 2021-11-29 RX ORDER — ALPRAZOLAM 2 MG/1
TABLET ORAL
Qty: 30 TABLET | Refills: 0 | OUTPATIENT
Start: 2021-11-29

## 2022-06-07 NOTE — TELEPHONE ENCOUNTER
Requested Prescriptions     Signed Prescriptions Disp Refills   • temazepam (RESTORIL) 30 MG capsule 30 Cap 0     Sig: Take 1 Cap by mouth at bedtime as needed for Sleep for up to 30 days.     Authorizing Provider: MINERVA LAUREANO   • alprazolam (XANAX) 2 MG tablet 30 Tab 0     Sig: Take 1 Tab by mouth 1 time daily as needed for Anxiety for up to 30 days.     Authorizing Provider: MINERVA LAUREANO A.P.R.N.     Xr shows some arthritis at L5-S1. I have ordered an mri here in [facility]

## 2023-02-22 ENCOUNTER — APPOINTMENT (OUTPATIENT)
Dept: LAB | Facility: MEDICAL CENTER | Age: 68
End: 2023-02-22
Payer: COMMERCIAL

## 2023-06-26 ENCOUNTER — APPOINTMENT (OUTPATIENT)
Dept: MEDICAL GROUP | Facility: MEDICAL CENTER | Age: 68
End: 2023-06-26

## 2023-06-26 RX ORDER — ESCITALOPRAM OXALATE 20 MG/1
20 TABLET ORAL DAILY
COMMUNITY
Start: 2023-06-03

## 2023-06-26 RX ORDER — TEMAZEPAM 30 MG/1
CAPSULE ORAL
COMMUNITY
Start: 2023-06-09

## 2023-06-26 RX ORDER — ALPRAZOLAM 2 MG/1
2 TABLET ORAL
COMMUNITY
Start: 2023-05-31

## 2023-06-26 NOTE — PROGRESS NOTES
Subjective:     CC: N2U    HPI:   Lupe presents today with    # MDD, Anxiety- managed by Marlen Sellers KEELY        No problems updated.    Health Maintenance: {COMPLETED:775152}    ROS:  ROS    Objective:     Exam:  There were no vitals taken for this visit. There is no height or weight on file to calculate BMI.    Physical Exam    {A chaperone was offered to the patient during today's exam.:[id number]}    Labs: ***    Assessment & Plan:     67 y.o. female with the following -     ***        Referral for genetic research was offered. Patient {declined/accepted}.    I spent a total of *** minutes with record review, exam, communication with the patient, communication with other providers, and documentation of this encounter.      No follow-ups on file.    Please note that this dictation was created using voice recognition software. I have made every reasonable attempt to correct obvious errors, but I expect that there are errors of grammar and possibly content that I did not discover before finalizing the note.